# Patient Record
Sex: FEMALE | Race: WHITE | NOT HISPANIC OR LATINO | Employment: FULL TIME | URBAN - METROPOLITAN AREA
[De-identification: names, ages, dates, MRNs, and addresses within clinical notes are randomized per-mention and may not be internally consistent; named-entity substitution may affect disease eponyms.]

---

## 2023-07-10 ENCOUNTER — OFFICE VISIT (OUTPATIENT)
Dept: PRIMARY CARE | Facility: CLINIC | Age: 33
End: 2023-07-10
Payer: COMMERCIAL

## 2023-07-10 VITALS
HEART RATE: 79 BPM | SYSTOLIC BLOOD PRESSURE: 112 MMHG | BODY MASS INDEX: 27.94 KG/M2 | OXYGEN SATURATION: 97 % | TEMPERATURE: 96.9 F | WEIGHT: 148 LBS | HEIGHT: 61 IN | DIASTOLIC BLOOD PRESSURE: 78 MMHG | RESPIRATION RATE: 16 BRPM

## 2023-07-10 DIAGNOSIS — F33.9 RECURRENT MAJOR DEPRESSIVE DISORDER, REMISSION STATUS UNSPECIFIED (CMS-HCC): Primary | ICD-10-CM

## 2023-07-10 DIAGNOSIS — E55.9 VITAMIN D DEFICIENCY: ICD-10-CM

## 2023-07-10 DIAGNOSIS — Z00.00 ENCOUNTER FOR ANNUAL GENERAL MEDICAL EXAMINATION WITHOUT ABNORMAL FINDINGS IN ADULT: ICD-10-CM

## 2023-07-10 DIAGNOSIS — E53.8 VITAMIN B 12 DEFICIENCY: ICD-10-CM

## 2023-07-10 PROCEDURE — 1036F TOBACCO NON-USER: CPT | Performed by: FAMILY MEDICINE

## 2023-07-10 PROCEDURE — 99214 OFFICE O/P EST MOD 30 MIN: CPT | Performed by: FAMILY MEDICINE

## 2023-07-10 PROCEDURE — 99395 PREV VISIT EST AGE 18-39: CPT | Performed by: FAMILY MEDICINE

## 2023-07-10 RX ORDER — IBUPROFEN 400 MG/1
TABLET ORAL EVERY 6 HOURS
COMMUNITY
Start: 2019-03-27

## 2023-07-10 RX ORDER — OMEPRAZOLE 20 MG/1
TABLET, ORALLY DISINTEGRATING, DELAYED RELEASE ORAL
COMMUNITY
Start: 2022-03-31

## 2023-07-10 RX ORDER — SERTRALINE HYDROCHLORIDE 50 MG/1
50 TABLET, FILM COATED ORAL DAILY
COMMUNITY
End: 2023-07-10 | Stop reason: SDUPTHER

## 2023-07-10 RX ORDER — BUPROPION HYDROCHLORIDE 150 MG/1
150 TABLET ORAL
Qty: 90 TABLET | Refills: 1 | Status: SHIPPED | OUTPATIENT
Start: 2023-07-10 | End: 2024-01-11 | Stop reason: SDUPTHER

## 2023-07-10 RX ORDER — MINERAL OIL
ENEMA (ML) RECTAL
COMMUNITY
Start: 2022-07-12

## 2023-07-10 RX ORDER — BUPROPION HYDROCHLORIDE 150 MG/1
150 TABLET ORAL
COMMUNITY
Start: 2023-06-23 | End: 2023-07-10 | Stop reason: SDUPTHER

## 2023-07-10 RX ORDER — TRIAMCINOLONE ACETONIDE 1 MG/G
OINTMENT TOPICAL 2 TIMES DAILY
COMMUNITY
Start: 2022-07-12

## 2023-07-10 RX ORDER — ACETAMINOPHEN, DEXTROMETHORPHAN HBR, DOXYLAMINE SUCCINATE, PHENYLEPHRINE HCL 650; 20; 12.5; 1 MG/30ML; MG/30ML; MG/30ML; MG/30ML
1 SOLUTION ORAL DAILY
COMMUNITY
Start: 2022-07-12 | End: 2024-03-20 | Stop reason: WASHOUT

## 2023-07-10 RX ORDER — ACETAMINOPHEN 500 MG
1 TABLET ORAL DAILY
COMMUNITY
Start: 2022-05-04

## 2023-07-10 RX ORDER — SERTRALINE HYDROCHLORIDE 100 MG/1
100 TABLET, FILM COATED ORAL DAILY
Qty: 90 TABLET | Refills: 1 | Status: SHIPPED | OUTPATIENT
Start: 2023-07-10 | End: 2024-01-11 | Stop reason: SDUPTHER

## 2023-07-10 ASSESSMENT — ENCOUNTER SYMPTOMS
FACIAL SWELLING: 0
TROUBLE SWALLOWING: 0
ADENOPATHY: 0
PALPITATIONS: 0
BRUISES/BLEEDS EASILY: 0
FLANK PAIN: 0
BACK PAIN: 0
NERVOUS/ANXIOUS: 0
LOSS OF SENSATION IN FEET: 0
DIZZINESS: 0
OCCASIONAL FEELINGS OF UNSTEADINESS: 0
ABDOMINAL PAIN: 0
POLYDIPSIA: 0
DEPRESSION: 0
EYE PAIN: 0
FATIGUE: 0
EYE REDNESS: 0
SLEEP DISTURBANCE: 0
CHILLS: 0
DYSURIA: 0
NECK STIFFNESS: 0
COUGH: 0
APPETITE CHANGE: 0
CONSTIPATION: 0
WOUND: 0
WHEEZING: 0
COLOR CHANGE: 0
SORE THROAT: 0
VOICE CHANGE: 0
JOINT SWELLING: 0
NAUSEA: 0
CHEST TIGHTNESS: 0
MYALGIAS: 0
SHORTNESS OF BREATH: 0
AGITATION: 0
EYE ITCHING: 0
HEMATURIA: 0
ARTHRALGIAS: 0
RHINORRHEA: 0
UNEXPECTED WEIGHT CHANGE: 0
ACTIVITY CHANGE: 0
POLYPHAGIA: 0
BLOOD IN STOOL: 0
SINUS PRESSURE: 0
FREQUENCY: 0
EYE DISCHARGE: 0
VOMITING: 0
SINUS PAIN: 0
FEVER: 0
DIARRHEA: 0
DIAPHORESIS: 0

## 2023-07-10 ASSESSMENT — ANXIETY QUESTIONNAIRES
1. FEELING NERVOUS, ANXIOUS, OR ON EDGE: SEVERAL DAYS
6. BECOMING EASILY ANNOYED OR IRRITABLE: SEVERAL DAYS
GAD7 TOTAL SCORE: 5
5. BEING SO RESTLESS THAT IT IS HARD TO SIT STILL: NOT AT ALL
7. FEELING AFRAID AS IF SOMETHING AWFUL MIGHT HAPPEN: NOT AT ALL
4. TROUBLE RELAXING: SEVERAL DAYS
2. NOT BEING ABLE TO STOP OR CONTROL WORRYING: SEVERAL DAYS
3. WORRYING TOO MUCH ABOUT DIFFERENT THINGS: SEVERAL DAYS

## 2023-07-10 ASSESSMENT — PROMIS GLOBAL HEALTH SCALE
RATE_GENERAL_HEALTH: GOOD
CARRYOUT_SOCIAL_ACTIVITIES: GOOD
RATE_SOCIAL_SATISFACTION: FAIR
RATE_QUALITY_OF_LIFE: GOOD
RATE_PHYSICAL_HEALTH: GOOD
RATE_AVERAGE_FATIGUE: MODERATE
CARRYOUT_PHYSICAL_ACTIVITIES: COMPLETELY
EMOTIONAL_PROBLEMS: OFTEN
RATE_MENTAL_HEALTH: FAIR
RATE_AVERAGE_PAIN: 3

## 2023-07-10 ASSESSMENT — PATIENT HEALTH QUESTIONNAIRE - PHQ9
1. LITTLE INTEREST OR PLEASURE IN DOING THINGS: NOT AT ALL
2. FEELING DOWN, DEPRESSED OR HOPELESS: NOT AT ALL
2. FEELING DOWN, DEPRESSED OR HOPELESS: SEVERAL DAYS
SUM OF ALL RESPONSES TO PHQ9 QUESTIONS 1 AND 2: 0
1. LITTLE INTEREST OR PLEASURE IN DOING THINGS: SEVERAL DAYS
SUM OF ALL RESPONSES TO PHQ9 QUESTIONS 1 AND 2: 2

## 2023-07-10 NOTE — PROGRESS NOTES
Subjective   Patient ID: Ashley E Broadbent is a 32 y.o. female who presents for Annual Exam.  Today she is accompanied by alone.     Well Adult Physical   Patient here for a comprehensive physical exam.The patient reports problems -    Depression:  PHQ2 Score: 2  Feels like the medication is working but still thinks there is some room for improvement of mood.     Do you take any herbs or supplements that were not prescribed by a doctor? no   Are you taking calcium supplements? no   Are you taking aspirin daily? no     History:  LMP: 6/30/23  Last pap date: 2020  Abnormal pap? no        Review of Systems   Constitutional:  Negative for activity change, appetite change, chills, diaphoresis, fatigue, fever and unexpected weight change.   HENT:  Negative for congestion, dental problem, drooling, ear discharge, ear pain, facial swelling, hearing loss, nosebleeds, postnasal drip, rhinorrhea, sinus pressure, sinus pain, sneezing, sore throat, tinnitus, trouble swallowing and voice change.    Eyes:  Negative for pain, discharge, redness, itching and visual disturbance.   Respiratory:  Negative for cough, chest tightness, shortness of breath and wheezing.    Cardiovascular:  Negative for chest pain, palpitations and leg swelling.   Gastrointestinal:  Negative for abdominal pain, blood in stool, constipation, diarrhea, nausea and vomiting.   Endocrine: Negative for cold intolerance, heat intolerance, polydipsia, polyphagia and polyuria.   Genitourinary:  Negative for decreased urine volume, dysuria, flank pain, frequency, hematuria and urgency.   Musculoskeletal:  Negative for arthralgias, back pain, gait problem, joint swelling, myalgias and neck stiffness.   Skin:  Negative for color change, pallor, rash and wound.   Neurological:  Negative for dizziness.   Hematological:  Negative for adenopathy. Does not bruise/bleed easily.   Psychiatric/Behavioral:  Negative for agitation, behavioral problems and sleep disturbance.  "The patient is not nervous/anxious.        Objective   Blood Pressure 112/78   Pulse 79   Temperature 36.1 °C (96.9 °F)   Respiration 16   Height 1.549 m (5' 1\")   Weight 67.1 kg (148 lb)   Last Menstrual Period  (LMP Unknown) Comment: Dr. Singletary  Oxygen Saturation 97%   Body Mass Index 27.96 kg/m²   BSA: 1.7 meters squared  Growth percentiles: Facility age limit for growth %danuta is 20 years. Facility age limit for growth %danuta is 20 years.     Physical Exam  Vitals and nursing note reviewed.   Constitutional:       General: She is not in acute distress.     Appearance: Normal appearance. She is normal weight. She is not ill-appearing or toxic-appearing.   HENT:      Head: Normocephalic.      Right Ear: Tympanic membrane, ear canal and external ear normal. There is no impacted cerumen.      Left Ear: Tympanic membrane, ear canal and external ear normal. There is no impacted cerumen.      Nose: Nose normal. No congestion or rhinorrhea.      Mouth/Throat:      Mouth: Mucous membranes are moist.      Pharynx: Oropharynx is clear. No oropharyngeal exudate or posterior oropharyngeal erythema.   Eyes:      General: No scleral icterus.        Right eye: No discharge.         Left eye: No discharge.      Extraocular Movements: Extraocular movements intact.      Conjunctiva/sclera: Conjunctivae normal.      Pupils: Pupils are equal, round, and reactive to light.   Neck:      Vascular: No carotid bruit.   Cardiovascular:      Rate and Rhythm: Normal rate and regular rhythm.      Pulses: Normal pulses.      Heart sounds: Normal heart sounds. No murmur heard.     No gallop.   Pulmonary:      Effort: No respiratory distress.      Breath sounds: No wheezing or rhonchi.   Chest:      Chest wall: No tenderness.   Abdominal:      General: Abdomen is flat. Bowel sounds are normal.      Palpations: Abdomen is soft. There is no mass.      Tenderness: There is no abdominal tenderness. There is no guarding or rebound.      " Hernia: No hernia is present.   Musculoskeletal:         General: No swelling or tenderness. Normal range of motion.      Cervical back: Normal range of motion. No rigidity or tenderness.      Right lower leg: No edema.      Left lower leg: No edema.   Lymphadenopathy:      Cervical: No cervical adenopathy.   Skin:     General: Skin is warm and dry.      Coloration: Skin is not pale.      Findings: No bruising, erythema or rash.   Neurological:      General: No focal deficit present.      Mental Status: She is alert and oriented to person, place, and time.      Sensory: No sensory deficit.      Coordination: Coordination normal.      Gait: Gait normal.      Deep Tendon Reflexes: Reflexes normal.   Psychiatric:         Mood and Affect: Mood normal.         Behavior: Behavior normal.         Thought Content: Thought content normal.         Judgment: Judgment normal.         Assessment/Plan   Problem List Items Addressed This Visit    None  Visit Diagnoses       Recurrent major depressive disorder, remission status unspecified (CMS/[facility])    -  Primary    Relevant Medications    sertraline (Zoloft) 100 mg tablet    buPROPion XL (Wellbutrin XL) 150 mg 24 hr tablet    Other Relevant Orders    TSH with reflex to Free T4 if abnormal    Encounter for annual general medical examination without abnormal findings in adult        Relevant Orders    Lipid Panel    CBC and Auto Differential    Comprehensive Metabolic Panel    HIV-1 and HIV-2 antibodies    Hepatitis C antibody    Vitamin D deficiency        Relevant Orders    Vitamin D, Total    Comprehensive Metabolic Panel    Vitamin B 12 deficiency        Relevant Orders    Vitamin B12    Comprehensive Metabolic Panel          Current Outpatient Medications   Medication Sig Dispense Refill    cholecalciferol (Vitamin D-3) 50 mcg (2,000 unit) capsule Take 1 capsule (50 mcg) by mouth once daily.      cyanocobalamin, vitamin B-12, (Vitamin B-12) 1,000 mcg tablet extended release  Take 1,000 mcg by mouth once daily.      fexofenadine (Allegra Allergy) 180 mg tablet Take by mouth once daily.      ibuprofen 400 mg tablet Take by mouth every 6 hours.      omeprazole 20 mg tablet,disintegrat, delay rel Take by mouth.      triamcinolone (Kenalog) 0.1 % ointment twice a day.      buPROPion XL (Wellbutrin XL) 150 mg 24 hr tablet Take 1 tablet (150 mg) by mouth once daily in the morning. Take before meals. 90 tablet 1    sertraline (Zoloft) 100 mg tablet Take 1 tablet (100 mg) by mouth once daily. 90 tablet 1     No current facility-administered medications for this visit.     Follow up in 4-6wks for Depression  If you experience any concerning side effects, please call.  Increased Sertraline to 100mg Daily  Medication refills sent to [facility]

## 2023-08-14 ENCOUNTER — LAB (OUTPATIENT)
Dept: LAB | Facility: LAB | Age: 33
End: 2023-08-14
Payer: COMMERCIAL

## 2023-08-14 DIAGNOSIS — E53.8 VITAMIN B 12 DEFICIENCY: ICD-10-CM

## 2023-08-14 DIAGNOSIS — Z00.00 ENCOUNTER FOR ANNUAL GENERAL MEDICAL EXAMINATION WITHOUT ABNORMAL FINDINGS IN ADULT: ICD-10-CM

## 2023-08-14 DIAGNOSIS — F33.9 RECURRENT MAJOR DEPRESSIVE DISORDER, REMISSION STATUS UNSPECIFIED (CMS-HCC): ICD-10-CM

## 2023-08-14 DIAGNOSIS — E55.9 VITAMIN D DEFICIENCY: ICD-10-CM

## 2023-08-14 LAB
ALANINE AMINOTRANSFERASE (SGPT) (U/L) IN SER/PLAS: 9 U/L (ref 7–45)
ALBUMIN (G/DL) IN SER/PLAS: 4.6 G/DL (ref 3.4–5)
ALKALINE PHOSPHATASE (U/L) IN SER/PLAS: 47 U/L (ref 33–110)
ANION GAP IN SER/PLAS: 12 MMOL/L (ref 10–20)
ASPARTATE AMINOTRANSFERASE (SGOT) (U/L) IN SER/PLAS: 15 U/L (ref 9–39)
BASOPHILS (10*3/UL) IN BLOOD BY AUTOMATED COUNT: 0.05 X10E9/L (ref 0–0.1)
BASOPHILS/100 LEUKOCYTES IN BLOOD BY AUTOMATED COUNT: 0.6 % (ref 0–2)
BILIRUBIN TOTAL (MG/DL) IN SER/PLAS: 0.3 MG/DL (ref 0–1.2)
CALCIUM (MG/DL) IN SER/PLAS: 9.7 MG/DL (ref 8.6–10.6)
CARBON DIOXIDE, TOTAL (MMOL/L) IN SER/PLAS: 28 MMOL/L (ref 21–32)
CHLORIDE (MMOL/L) IN SER/PLAS: 104 MMOL/L (ref 98–107)
CHOLESTEROL (MG/DL) IN SER/PLAS: 243 MG/DL (ref 0–199)
CHOLESTEROL IN HDL (MG/DL) IN SER/PLAS: 55.9 MG/DL
CHOLESTEROL/HDL RATIO: 4.3
CREATININE (MG/DL) IN SER/PLAS: 0.68 MG/DL (ref 0.5–1.05)
EOSINOPHILS (10*3/UL) IN BLOOD BY AUTOMATED COUNT: 0.16 X10E9/L (ref 0–0.7)
EOSINOPHILS/100 LEUKOCYTES IN BLOOD BY AUTOMATED COUNT: 1.8 % (ref 0–6)
ERYTHROCYTE DISTRIBUTION WIDTH (RATIO) BY AUTOMATED COUNT: 13.2 % (ref 11.5–14.5)
ERYTHROCYTE MEAN CORPUSCULAR HEMOGLOBIN CONCENTRATION (G/DL) BY AUTOMATED: 31.3 G/DL (ref 32–36)
ERYTHROCYTE MEAN CORPUSCULAR VOLUME (FL) BY AUTOMATED COUNT: 91 FL (ref 80–100)
ERYTHROCYTES (10*6/UL) IN BLOOD BY AUTOMATED COUNT: 4.83 X10E12/L (ref 4–5.2)
GFR FEMALE: >90 ML/MIN/1.73M2
GLUCOSE (MG/DL) IN SER/PLAS: 79 MG/DL (ref 74–99)
HEMATOCRIT (%) IN BLOOD BY AUTOMATED COUNT: 43.8 % (ref 36–46)
HEMOGLOBIN (G/DL) IN BLOOD: 13.7 G/DL (ref 12–16)
IMMATURE GRANULOCYTES/100 LEUKOCYTES IN BLOOD BY AUTOMATED COUNT: 0.3 % (ref 0–0.9)
LDL: 151 MG/DL (ref 0–99)
LEUKOCYTES (10*3/UL) IN BLOOD BY AUTOMATED COUNT: 8.9 X10E9/L (ref 4.4–11.3)
LYMPHOCYTES (10*3/UL) IN BLOOD BY AUTOMATED COUNT: 2.55 X10E9/L (ref 1.2–4.8)
LYMPHOCYTES/100 LEUKOCYTES IN BLOOD BY AUTOMATED COUNT: 28.7 % (ref 13–44)
MONOCYTES (10*3/UL) IN BLOOD BY AUTOMATED COUNT: 0.62 X10E9/L (ref 0.1–1)
MONOCYTES/100 LEUKOCYTES IN BLOOD BY AUTOMATED COUNT: 7 % (ref 2–10)
NEUTROPHILS (10*3/UL) IN BLOOD BY AUTOMATED COUNT: 5.48 X10E9/L (ref 1.2–7.7)
NEUTROPHILS/100 LEUKOCYTES IN BLOOD BY AUTOMATED COUNT: 61.6 % (ref 40–80)
NRBC (PER 100 WBCS) BY AUTOMATED COUNT: 0 /100 WBC (ref 0–0)
PLATELETS (10*3/UL) IN BLOOD AUTOMATED COUNT: 324 X10E9/L (ref 150–450)
POTASSIUM (MMOL/L) IN SER/PLAS: 4.2 MMOL/L (ref 3.5–5.3)
PROTEIN TOTAL: 7.1 G/DL (ref 6.4–8.2)
SODIUM (MMOL/L) IN SER/PLAS: 140 MMOL/L (ref 136–145)
TRIGLYCERIDE (MG/DL) IN SER/PLAS: 180 MG/DL (ref 0–149)
UREA NITROGEN (MG/DL) IN SER/PLAS: 12 MG/DL (ref 6–23)
VLDL: 36 MG/DL (ref 0–40)

## 2023-08-14 PROCEDURE — 80053 COMPREHEN METABOLIC PANEL: CPT

## 2023-08-14 PROCEDURE — 87389 HIV-1 AG W/HIV-1&-2 AB AG IA: CPT

## 2023-08-14 PROCEDURE — 82607 VITAMIN B-12: CPT

## 2023-08-14 PROCEDURE — 85025 COMPLETE CBC W/AUTO DIFF WBC: CPT

## 2023-08-14 PROCEDURE — 80061 LIPID PANEL: CPT

## 2023-08-14 PROCEDURE — 36415 COLL VENOUS BLD VENIPUNCTURE: CPT

## 2023-08-14 PROCEDURE — 82306 VITAMIN D 25 HYDROXY: CPT

## 2023-08-14 PROCEDURE — 84443 ASSAY THYROID STIM HORMONE: CPT

## 2023-08-14 PROCEDURE — 86803 HEPATITIS C AB TEST: CPT

## 2023-08-15 ENCOUNTER — APPOINTMENT (OUTPATIENT)
Dept: PRIMARY CARE | Facility: CLINIC | Age: 33
End: 2023-08-15
Payer: COMMERCIAL

## 2023-08-15 LAB
CALCIDIOL (25 OH VITAMIN D3) (NG/ML) IN SER/PLAS: 48 NG/ML
COBALAMIN (VITAMIN B12) (PG/ML) IN SER/PLAS: 266 PG/ML (ref 211–911)
HEPATITIS C VIRUS AB PRESENCE IN SERUM: NONREACTIVE
HIV 1/ 2 AG/AB SCREEN: NONREACTIVE
THYROTROPIN (MIU/L) IN SER/PLAS BY DETECTION LIMIT <= 0.05 MIU/L: 2.76 MIU/L (ref 0.44–3.98)

## 2023-08-17 ENCOUNTER — OFFICE VISIT (OUTPATIENT)
Dept: PRIMARY CARE | Facility: CLINIC | Age: 33
End: 2023-08-17
Payer: COMMERCIAL

## 2023-08-17 VITALS
DIASTOLIC BLOOD PRESSURE: 75 MMHG | TEMPERATURE: 97.4 F | BODY MASS INDEX: 27.78 KG/M2 | OXYGEN SATURATION: 97 % | HEART RATE: 80 BPM | SYSTOLIC BLOOD PRESSURE: 112 MMHG | WEIGHT: 147 LBS | RESPIRATION RATE: 16 BRPM

## 2023-08-17 DIAGNOSIS — E53.8 VITAMIN B 12 DEFICIENCY: ICD-10-CM

## 2023-08-17 DIAGNOSIS — F41.1 GENERALIZED ANXIETY DISORDER: ICD-10-CM

## 2023-08-17 DIAGNOSIS — E78.2 MIXED HYPERLIPIDEMIA: Primary | ICD-10-CM

## 2023-08-17 DIAGNOSIS — R73.09 ABNORMAL GLUCOSE: ICD-10-CM

## 2023-08-17 DIAGNOSIS — F33.9 CHRONIC MAJOR DEPRESSIVE DISORDER, RECURRENT EPISODE (CMS-HCC): ICD-10-CM

## 2023-08-17 DIAGNOSIS — E55.9 VITAMIN D DEFICIENCY: ICD-10-CM

## 2023-08-17 PROBLEM — L20.9 ATOPIC ECZEMA: Status: RESOLVED | Noted: 2023-08-17 | Resolved: 2023-08-17

## 2023-08-17 PROBLEM — J45.909 REACTIVE AIRWAY DISEASE (HHS-HCC): Status: RESOLVED | Noted: 2023-08-17 | Resolved: 2023-08-17

## 2023-08-17 PROBLEM — R00.2 PALPITATION: Status: RESOLVED | Noted: 2023-08-17 | Resolved: 2023-08-17

## 2023-08-17 PROBLEM — R61 NIGHT SWEATS: Status: RESOLVED | Noted: 2023-08-17 | Resolved: 2023-08-17

## 2023-08-17 PROBLEM — J30.9 ALLERGIC RHINITIS: Status: RESOLVED | Noted: 2023-08-17 | Resolved: 2023-08-17

## 2023-08-17 PROBLEM — N92.6 IRREGULAR PERIODS: Status: RESOLVED | Noted: 2022-03-23 | Resolved: 2023-08-17

## 2023-08-17 PROCEDURE — 99214 OFFICE O/P EST MOD 30 MIN: CPT | Performed by: FAMILY MEDICINE

## 2023-08-17 PROCEDURE — 96372 THER/PROPH/DIAG INJ SC/IM: CPT | Performed by: FAMILY MEDICINE

## 2023-08-17 PROCEDURE — 1036F TOBACCO NON-USER: CPT | Performed by: FAMILY MEDICINE

## 2023-08-17 RX ORDER — CYANOCOBALAMIN 1000 UG/ML
1000 INJECTION, SOLUTION INTRAMUSCULAR; SUBCUTANEOUS ONCE
Status: COMPLETED | OUTPATIENT
Start: 2023-08-17 | End: 2023-08-17

## 2023-08-17 RX ADMIN — CYANOCOBALAMIN 1000 MCG: 1000 INJECTION, SOLUTION INTRAMUSCULAR; SUBCUTANEOUS at 15:02

## 2023-08-17 ASSESSMENT — ENCOUNTER SYMPTOMS
FATIGUE: 0
NERVOUS/ANXIOUS: 0
PANIC: 0
EYE DISCHARGE: 0
HOPELESSNESS: 1
UNEXPECTED WEIGHT CHANGE: 0
ACTIVITY CHANGE: 0
NAUSEA: 0
THOUGHTS THAT DEATH WOULD BE EASIER: 0
NECK STIFFNESS: 0
FREQUENCY: 0
BACK PAIN: 0
SINUS PAIN: 0
CONSTIPATION: 0
PSYCHOMOTOR AGITATION: 0
SORE THROAT: 0
ARTHRALGIAS: 0
MUSCLE TENSION: 0
DYSURIA: 0
EYE REDNESS: 0
AGITATION: 0
FACIAL SWELLING: 0
POLYDIPSIA: 0
SLEEP DISTURBANCE: 0
WHEEZING: 0
CONFUSION: 0
LOSS OF SENSATION IN FEET: 0
EYE PAIN: 0
DIARRHEA: 0
APPETITE CHANGE: 0
CHEST TIGHTNESS: 0
ADENOPATHY: 0
VOICE CHANGE: 0
PALPITATIONS: 0
DEPRESSION: 1
BLOOD IN STOOL: 0
WOUND: 0
VOMITING: 0
MEMORY IMPAIRMENT: 0
DECREASED CONCENTRATION: 0
DEPRESSED MOOD: 0
ANHEDONIA: 0
HYPERSOMNIA: 0
JOINT SWELLING: 0
SLEEP QUALITY: GOOD
RESTLESSNESS: 0
COUGH: 0
EYE ITCHING: 0
FEVER: 0
HYPERVENTILATION: 0
CHILLS: 0
COMPULSIONS: 0
IRRITABILITY: 0
CHOKING: 0
PSYCHOMOTOR RETARDATION: 0
WEIGHT LOSS: 0
DIAPHORESIS: 0
OCCASIONAL FEELINGS OF UNSTEADINESS: 0
FEELINGS OF WORTHLESSNESS: 0
THOUGHT CONTENT - OBSESSIONS: 0
DEPRESSION: 0
TROUBLE SWALLOWING: 0
HEMATURIA: 0
MYALGIAS: 0
BRUISES/BLEEDS EASILY: 0
POLYPHAGIA: 0
INSOMNIA: 0
RHINORRHEA: 0
ABDOMINAL PAIN: 0
SINUS PRESSURE: 0
MALAISE: 0
DIZZINESS: 0
FLANK PAIN: 0
WEIGHT GAIN: 0
SHORTNESS OF BREATH: 0
COLOR CHANGE: 0

## 2023-08-17 ASSESSMENT — PATIENT HEALTH QUESTIONNAIRE - PHQ9
1. LITTLE INTEREST OR PLEASURE IN DOING THINGS: NOT AT ALL
2. FEELING DOWN, DEPRESSED OR HOPELESS: NOT AT ALL
SUM OF ALL RESPONSES TO PHQ9 QUESTIONS 1 AND 2: 0

## 2023-08-17 NOTE — PROGRESS NOTES
Subjective   Patient ID: Ashley E Broadbent is a 33 y.o. female who presents for Depression (Follow up and review labs).  Today she is accompanied by accompanied by child.     Depression  Visit Type: follow-up  Patient presents with the following symptoms: feelings of hopelessness.  Patient is not experiencing: anhedonia, chest pain, choking sensation, compulsions, confusion, decreased concentration, depressed mood, dizziness, dry mouth, excessive worry, fatigue, feelings of worthlessness, hypersomnia, hyperventilation, impotence, insomnia, irritability, malaise, memory impairment, muscle tension, nausea, nervousness/anxiety, obsessions, palpitations, panic, psychomotor agitation, psychomotor retardation, restlessness, shortness of breath, suicidal ideas, suicidal planning, thoughts of death, weight gain and weight loss.  Frequency of symptoms: occasionally   Severity: mild   Sleep quality: good  Nighttime awakenings: none  Compliance with medications:  %      Hyperlipidemia  Ashley E Broadbent is a 33 y.o. female who presents for follow-up of dyslipidemia. A repeat fasting lipid profile was done. The patient does not use medications that may worsen dyslipidemias (corticosteroids, progestins, anabolic steroids, diuretics, beta-blockers, amiodarone, cyclosporine, olanzapine). Exercise: rarely. Previous history of cardiac disease includes: none.    Cardiac Risk Factors  Age > 45-male, > 55-female:  NO   Smoking:   NO   Sig. family hx of CHD*:  YES  +1   Hypertension:   NO   Diabetes:   NO   HDL < 35:   NO   HDL > 59:   YES  -1   Total:   0     *Significant family history of CHD per NCEP = MI or sudden death at less than 55 year old in father or other 1st-degree male relative, or less than 65 year old in mother or other 1st-degree female relative.    The following portions of the chart were reviewed this encounter and updated as appropriate:  Tobacco  Allergies  Meds  Problems  Med Hx  Surg Hx  Fam Hx        Review of Systems   Constitutional:  Negative for activity change, appetite change, chills, diaphoresis, fatigue, fever, irritability, unexpected weight change, weight gain and weight loss.   HENT:  Negative for congestion, dental problem, drooling, ear discharge, ear pain, facial swelling, hearing loss, nosebleeds, postnasal drip, rhinorrhea, sinus pressure, sinus pain, sneezing, sore throat, tinnitus, trouble swallowing and voice change.    Eyes:  Negative for pain, discharge, redness, itching and visual disturbance.   Respiratory:  Negative for cough, choking, chest tightness, shortness of breath and wheezing.    Cardiovascular:  Negative for chest pain, palpitations and leg swelling.   Gastrointestinal:  Negative for abdominal pain, blood in stool, constipation, diarrhea, nausea and vomiting.   Endocrine: Negative for cold intolerance, heat intolerance, polydipsia, polyphagia and polyuria.   Genitourinary:  Negative for decreased urine volume, dysuria, flank pain, frequency, hematuria, impotence and urgency.   Musculoskeletal:  Negative for arthralgias, back pain, gait problem, joint swelling, myalgias and neck stiffness.   Skin:  Negative for color change, pallor, rash and wound.   Neurological:  Negative for dizziness.   Hematological:  Negative for adenopathy. Does not bruise/bleed easily.   Psychiatric/Behavioral:  Positive for depression. Negative for agitation, behavioral problems, confusion, decreased concentration, sleep disturbance and suicidal ideas. The patient is not nervous/anxious and does not have insomnia.        Objective   Blood Pressure 112/75 (BP Location: Left arm, Patient Position: Sitting, BP Cuff Size: Adult)   Pulse 80   Temperature 36.3 °C (97.4 °F)   Respiration 16   Weight 66.7 kg (147 lb)   Oxygen Saturation 97%   Body Mass Index 27.78 kg/m²   BSA: 1.69 meters squared  Growth percentiles: Facility age limit for growth %danuta is 20 years. Facility age limit for growth %danuta is  20 years.     Physical Exam  Vitals and nursing note reviewed.   Constitutional:       General: She is not in acute distress.     Appearance: Normal appearance. She is normal weight. She is not ill-appearing or toxic-appearing.   HENT:      Head: Normocephalic.      Right Ear: Tympanic membrane, ear canal and external ear normal. There is no impacted cerumen.      Left Ear: Tympanic membrane, ear canal and external ear normal. There is no impacted cerumen.      Nose: Nose normal. No congestion or rhinorrhea.      Mouth/Throat:      Mouth: Mucous membranes are moist.      Pharynx: Oropharynx is clear. No oropharyngeal exudate or posterior oropharyngeal erythema.   Eyes:      General: No scleral icterus.        Right eye: No discharge.         Left eye: No discharge.      Extraocular Movements: Extraocular movements intact.      Conjunctiva/sclera: Conjunctivae normal.      Pupils: Pupils are equal, round, and reactive to light.   Neck:      Vascular: No carotid bruit.   Cardiovascular:      Rate and Rhythm: Normal rate and regular rhythm.      Pulses: Normal pulses.      Heart sounds: Normal heart sounds. No murmur heard.     No gallop.   Pulmonary:      Effort: No respiratory distress.      Breath sounds: No wheezing or rhonchi.   Chest:      Chest wall: No tenderness.   Abdominal:      General: Abdomen is flat. Bowel sounds are normal.      Palpations: Abdomen is soft. There is no mass.      Tenderness: There is no abdominal tenderness. There is no guarding or rebound.      Hernia: No hernia is present.   Musculoskeletal:         General: No swelling or tenderness. Normal range of motion.      Cervical back: Normal range of motion. No rigidity or tenderness.      Right lower leg: No edema.      Left lower leg: No edema.   Lymphadenopathy:      Cervical: No cervical adenopathy.   Skin:     General: Skin is warm and dry.      Coloration: Skin is not pale.      Findings: No bruising, erythema or rash.   Neurological:       General: No focal deficit present.      Mental Status: She is alert and oriented to person, place, and time.      Sensory: No sensory deficit.      Coordination: Coordination normal.      Gait: Gait normal.      Deep Tendon Reflexes: Reflexes normal.   Psychiatric:         Mood and Affect: Mood normal.         Behavior: Behavior normal.         Thought Content: Thought content normal.         Judgment: Judgment normal.         Assessment/Plan   Problem List Items Addressed This Visit       Generalized anxiety disorder    Relevant Orders    Magnesium    Chronic major depressive disorder, recurrent episode (CMS/HCC)    Relevant Orders    Magnesium    Vitamin D deficiency    Relevant Orders    Vitamin D, Total     Other Visit Diagnoses       Mixed hyperlipidemia    -  Primary    Relevant Orders    Lipid Panel    CBC and Auto Differential    Comprehensive Metabolic Panel    CT cardiac scoring wo IV contrast    Vitamin B 12 deficiency        Relevant Medications    cyanocobalamin (Vitamin B-12) injection 1,000 mcg    Other Relevant Orders    Vitamin B12    Abnormal glucose        Relevant Orders    Hemoglobin A1C          Current Outpatient Medications   Medication Sig Dispense Refill    buPROPion XL (Wellbutrin XL) 150 mg 24 hr tablet Take 1 tablet (150 mg) by mouth once daily in the morning. Take before meals. 90 tablet 1    cholecalciferol (Vitamin D-3) 50 mcg (2,000 unit) capsule Take 1 capsule (50 mcg) by mouth once daily.      cyanocobalamin, vitamin B-12, (Vitamin B-12) 1,000 mcg tablet extended release Take 1,000 mcg by mouth once daily.      fexofenadine (Allegra Allergy) 180 mg tablet Take by mouth once daily.      ibuprofen 400 mg tablet Take by mouth every 6 hours.      omeprazole 20 mg tablet,disintegrat, delay rel Take by mouth.      sertraline (Zoloft) 100 mg tablet Take 1 tablet (100 mg) by mouth once daily. 90 tablet 1    triamcinolone (Kenalog) 0.1 % ointment twice a day.       Current  Facility-Administered Medications   Medication Dose Route Frequency Provider Last Rate Last Admin    cyanocobalamin (Vitamin B-12) injection 1,000 mcg  1,000 mcg subcutaneous Once Charlette Valladares MD         Depression      What is depression?;  When doctors talk about depression, they mean the medical illness called major depression. Someone who has major depression has symptoms nearly every day, all day, for 2 weeks or longer. There is also a minor form of depression that causes less severe symptoms. Both kinds of depression have the same causes and treatment.;  Depression can affect people of all ages and is different for every person. A person who has depression can't control his or her feelings. If you or your child, teen, or older relative is depressed, it's not his or her fault.     Women are twice as likely as men to experience depression. The reason for this is unknown, but changes in a woman's hormone levels may be related to depression.     What are the symptoms of depression?;  The symptoms of depression are different for every person. You may have one or many of the symptoms listed below. Your symptoms may include only emotional or only physical symptoms, or both.;     Emotional symptoms  Crying easily or for no reason ;  Feeling guilty or worthless ;  Feeling restless, irritated, and easily annoyed ;  Feeling sad, numb, or hopeless ;  Losing interest or pleasure in things you used to enjoy (including sex) ;  Thinking about death or suicide;     Physical symptoms  Changes in appetite (eating more than usual, or eating less than usual) ;  Feeling very tired all the time ;  Having other aches and pains that don't get better with treatment ;  Having trouble paying attention, recalling things, concentrating, and making decisions ;  Headaches, backaches, or digestive problems ;  Sleeping too much, or having problems sleeping ;  Unintended weight loss or gain;  The symptoms of depression may be different for  "children, teens, and seniors.;     What causes depression?  Depression may be caused by an imbalance of chemicals in the brain. Sometimes there aren't enough chemical messengers (called neurotransmitters) in the brain. Examples of neurotransmitters that affect your mood are serotonin (say: \"sair-a-tone-in\"), norepinephrine (say: \"nor-ep-in-ef-rin\"), and dopamine (say: \"dope-a-mean\"). A chemical imbalance in the brain may be caused by one or more of the following:;  Your genes. Sometimes depression is hereditary, meaning it runs in your family. If you have a parent or sibling who has depression, you may be more at risk for having depression yourself. ;  A medical condition. Problems with your thyroid, nutrient deficiencies, or chronic diseases such as heart disease, diabetes, or cancer may cause depression. ;  Events in your life. Depression can be triggered by stressful events in your life, such as the death of someone you love, a divorce, chronic illness, or loss of a job.   Medicines, drugs, or alcohol. Taking certain medicines, abusing drugs or alcohol, or having other illnesses can also lead to depression.;     Depression is not caused by personal weakness, laziness, or lack of willpower.;     Can giving birth cause depression?;  In the days following the birth of a baby, it is common for some mothers to have mood swings. They may feel a little depressed, have a hard time concentrating, lose their appetite, or find that they can't sleep well even when the baby is asleep. This is called the baby blues and goes away within 10 days after delivery. However, some women have worse symptoms or symptoms that last longer. This is called postpartum depression.     How is depression treated?  Depression can be treated with medicines, with counseling <[email or web address]/familydoctor/[email or web address]-[email or web address]-[email or web address]-health/therapy-and-counseling.html>, or with both. A nutritious diet, exercising on a " regular basis, and avoiding alcohol, drugs, and too much caffeine can also help.;     How long will the depression last?  This depends on how soon you get help. Left untreated, depression can last for weeks, months, or even years. The main risk in not getting treatment is suicide. Treatment can help depression lift in 8 to 12 weeks or less.;     What medicines are used to treat depression?;  Medicines that treat depression are called antidepressants. They help increase the number of chemical messengers (serotonin, norepinephrine, dopamine) in your brain.  Antidepressants work differently for different people. They also have different side effects. So, even if one medicine bothers you or doesn't work for you, another may help. You may notice improvement as soon as 1 week after you start taking the medicine. But you probably won't see the full effects for about 8 to 12 weeks. You may have side effects at first, but they tend to decrease after a couple of weeks. Don't stop taking the medicine without checking with your doctor first.;     How does counseling help?;   A combination of medicine and talk therapy is usually the most effective way of treating more severe depression. If you continue the combination treatment for at least a year, you are less likely to have depression come back.;you talk with a trained therapist or counselor about things that are going on in your life. The focus may be on your thoughts and beliefs, on things that happened in your past, or on your relationships. Or the focus may be on your behavior, how it's affecting you, and what you can do differently.      Tips on getting through depression  DO:  Pace yourself.   Get involved in activities that make you feel good or feel like you've achieved something.   Avoid drugs and alcohol. Both make depression worse. Both can cause dangerous side effects with antidepressant medicines.   Exercise often to make yourself feel better. Physical activity  "seems to cause a chemical reaction in the body that can improve your mood. Exercising 4 to 6 times a week for at least 30 minutes each time is a good goal. But even less activity can be helpful.   Eat balanced meals and healthful foods.   Get enough sleep.   Take your medicine and/or go to counseling as often as your doctor recommends. Your medicine won't work if you only take it once in a while.   Set small goals for yourself, because you may have less energy.   Encourage yourself.   Get as much information as you can about depression and how to treat it.   Call your doctor or the local suicide crisis center right away if you start thinking about suicide.  DON'T:  Don't isolate yourself. Stay in touch with your loved ones and friends, your [employer] advisor, and your family doctor.   Don't believe negative thoughts you may have, such as blaming yourself or expecting to fail. This thinking is part of depression. These thoughts will go away as your depression lifts.   Don't blame yourself for your depression. You didn't cause it.   Don't make major life decisions (for example, about separation or divorce). You may not be thinking clearly while you are depressed, so the decisions you make at this time may not be the best ones for you. If you must make a big decision, ask someone you trust to help you.   Don't expect to do everything you normally can. Set a realistic schedule.   Don't get discouraged. It will take time for your depression to lift fully. Be patient with yourself.   Don't give up.     READ: \"The Mindfulness and Acceptance Workbook for Depression\" by Collins and Alma  \"The Mindful Way Through Depression\" by Pillo Harrison Segal, and [place]ot-Zinn        Suicide;  People who have depression sometimes think about suicide. This thinking is a common part of the depression. If you have thoughts about hurting yourself, tell someone. You could tell your doctor, your friends, your family, or call your local " suicide hotline, such as the National Suicide Prevention Lifeline at 1-948.117.5275.;  ou have high cholesterol. (hyperlipidemia). This increases your risk for cardiovascular disease.(Heart attack/stroke/circulation);  Continue any prescribed and advised OTC medications, in addition, as reminder:;   For high LDL (>130) use blueberries 1 cup daily;   Plant Stanol dose 950mg twice daily (may need to find online source for this dose), and ;    Metamucil/psyllium fiber 7 grams daily.;  ;  For high Triglycerides:;   Fish oil (start at 2000-3000mg daily);  ;   You should exercise 30 minutes daily. ;  ;   Your nutrition plan needs to emphasizes vegetables, fruits, and lean meat. I recommend the Mediterranean Diet ;  Please find this link to helpful information about lowering your cholesterol: http://www.aafp.org/afp/2010/0501/p1103.html;

## 2023-08-17 NOTE — PATIENT INSTRUCTIONS
Current Outpatient Medications   Medication Sig Dispense Refill    buPROPion XL (Wellbutrin XL) 150 mg 24 hr tablet Take 1 tablet (150 mg) by mouth once daily in the morning. Take before meals. 90 tablet 1    cholecalciferol (Vitamin D-3) 50 mcg (2,000 unit) capsule Take 1 capsule (50 mcg) by mouth once daily.      cyanocobalamin, vitamin B-12, (Vitamin B-12) 1,000 mcg tablet extended release Take 1,000 mcg by mouth once daily.      fexofenadine (Allegra Allergy) 180 mg tablet Take by mouth once daily.      ibuprofen 400 mg tablet Take by mouth every 6 hours.      omeprazole 20 mg tablet,disintegrat, delay rel Take by mouth.      sertraline (Zoloft) 100 mg tablet Take 1 tablet (100 mg) by mouth once daily. 90 tablet 1    triamcinolone (Kenalog) 0.1 % ointment twice a day.       Current Facility-Administered Medications   Medication Dose Route Frequency Provider Last Rate Last Admin    cyanocobalamin (Vitamin B-12) injection 1,000 mcg  1,000 mcg subcutaneous Once Charlette Valladares MD         ou have high cholesterol. (hyperlipidemia). This increases your risk for cardiovascular disease.(Heart attack/stroke/circulation);  Continue any prescribed and advised OTC medications, in addition, as reminder:;   For high LDL (>130) use blueberries 1 cup daily;   Plant Stanol dose 950mg twice daily (may need to find online source for this dose), and ;    Metamucil/psyllium fiber 7 grams daily.;  ;  For high Triglycerides:;   Fish oil (start at 2000-3000mg daily);  ;   You should exercise 30 minutes daily. ;  ;   Your nutrition plan needs to emphasizes vegetables, fruits, and lean meat. I recommend the Mediterranean Diet ;  Please find this link to helpful information about lowering your cholesterol: http://www.aafp.org/afp/2010/0501/p1103.html;  Depression      What is depression?;  When doctors talk about depression, they mean the medical illness called major depression. Someone who has major depression has symptoms nearly every  "day, all day, for 2 weeks or longer. There is also a minor form of depression that causes less severe symptoms. Both kinds of depression have the same causes and treatment.;  Depression can affect people of all ages and is different for every person. A person who has depression can't control his or her feelings. If you or your child, teen, or older relative is depressed, it's not his or her fault.     Women are twice as likely as men to experience depression. The reason for this is unknown, but changes in a woman's hormone levels may be related to depression.     What are the symptoms of depression?;  The symptoms of depression are different for every person. You may have one or many of the symptoms listed below. Your symptoms may include only emotional or only physical symptoms, or both.;     Emotional symptoms  Crying easily or for no reason ;  Feeling guilty or worthless ;  Feeling restless, irritated, and easily annoyed ;  Feeling sad, numb, or hopeless ;  Losing interest or pleasure in things you used to enjoy (including sex) ;  Thinking about death or suicide;     Physical symptoms  Changes in appetite (eating more than usual, or eating less than usual) ;  Feeling very tired all the time ;  Having other aches and pains that don't get better with treatment ;  Having trouble paying attention, recalling things, concentrating, and making decisions ;  Headaches, backaches, or digestive problems ;  Sleeping too much, or having problems sleeping ;  Unintended weight loss or gain;  The symptoms of depression may be different for children, teens, and seniors.;     What causes depression?  Depression may be caused by an imbalance of chemicals in the brain. Sometimes there aren't enough chemical messengers (called neurotransmitters) in the brain. Examples of neurotransmitters that affect your mood are serotonin (say: \"sair-a-tone-in\"), norepinephrine (say: \"nor-ep-in-ef-rin\"), and dopamine (say: \"dope-a-mean\"). A " chemical imbalance in the brain may be caused by one or more of the following:;  Your genes. Sometimes depression is hereditary, meaning it runs in your family. If you have a parent or sibling who has depression, you may be more at risk for having depression yourself. ;  A medical condition. Problems with your thyroid, nutrient deficiencies, or chronic diseases such as heart disease, diabetes, or cancer may cause depression. ;  Events in your life. Depression can be triggered by stressful events in your life, such as the death of someone you love, a divorce, chronic illness, or loss of a job.   Medicines, drugs, or alcohol. Taking certain medicines, abusing drugs or alcohol, or having other illnesses can also lead to depression.;     Depression is not caused by personal weakness, laziness, or lack of willpower.;     Can giving birth cause depression?;  In the days following the birth of a baby, it is common for some mothers to have mood swings. They may feel a little depressed, have a hard time concentrating, lose their appetite, or find that they can't sleep well even when the baby is asleep. This is called the baby blues and goes away within 10 days after delivery. However, some women have worse symptoms or symptoms that last longer. This is called postpartum depression.     How is depression treated?  Depression can be treated with medicines, with counseling <[email or web address]-[email or web address]-[email or web address]-[email or web address]>, or with both. A nutritious diet, exercising on a regular basis, and avoiding alcohol, drugs, and too much caffeine can also help.;     How long will the depression last?  This depends on how soon you get help. Left untreated, depression can last for weeks, months, or even years. The main risk in not getting treatment is suicide. Treatment can help depression lift in 8 to 12 weeks or less.;     What medicines are used to treat  depression?;  Medicines that treat depression are called antidepressants. They help increase the number of chemical messengers (serotonin, norepinephrine, dopamine) in your brain.  Antidepressants work differently for different people. They also have different side effects. So, even if one medicine bothers you or doesn't work for you, another may help. You may notice improvement as soon as 1 week after you start taking the medicine. But you probably won't see the full effects for about 8 to 12 weeks. You may have side effects at first, but they tend to decrease after a couple of weeks. Don't stop taking the medicine without checking with your doctor first.;     How does counseling help?;   A combination of medicine and talk therapy is usually the most effective way of treating more severe depression. If you continue the combination treatment for at least a year, you are less likely to have depression come back.;you talk with a trained therapist or counselor about things that are going on in your life. The focus may be on your thoughts and beliefs, on things that happened in your past, or on your relationships. Or the focus may be on your behavior, how it's affecting you, and what you can do differently.      Tips on getting through depression  DO:  Pace yourself.   Get involved in activities that make you feel good or feel like you've achieved something.   Avoid drugs and alcohol. Both make depression worse. Both can cause dangerous side effects with antidepressant medicines.   Exercise often to make yourself feel better. Physical activity seems to cause a chemical reaction in the body that can improve your mood. Exercising 4 to 6 times a week for at least 30 minutes each time is a good goal. But even less activity can be helpful.   Eat balanced meals and healthful foods.   Get enough sleep.   Take your medicine and/or go to counseling as often as your doctor recommends. Your medicine won't work if you only take it  "once in a while.   Set small goals for yourself, because you may have less energy.   Encourage yourself.   Get as much information as you can about depression and how to treat it.   Call your doctor or the local suicide crisis center right away if you start thinking about suicide.  DON'T:  Don't isolate yourself. Stay in touch with your loved ones and friends, your Taoist advisor, and your family doctor.   Don't believe negative thoughts you may have, such as blaming yourself or expecting to fail. This thinking is part of depression. These thoughts will go away as your depression lifts.   Don't blame yourself for your depression. You didn't cause it.   Don't make major life decisions (for example, about separation or divorce). You may not be thinking clearly while you are depressed, so the decisions you make at this time may not be the best ones for you. If you must make a big decision, ask someone you trust to help you.   Don't expect to do everything you normally can. Set a realistic schedule.   Don't get discouraged. It will take time for your depression to lift fully. Be patient with yourself.   Don't give up.     READ: \"The Mindfulness and Acceptance Workbook for Depression\" by Collins and Alma  \"The Mindful Way Through Depression\" by Pillo Harrison Segal, and Cabot-Zinn        Suicide;  People who have depression sometimes think about suicide. This thinking is a common part of the depression. If you have thoughts about hurting yourself, tell someone. You could tell your doctor, your friends, your family, or call your local suicide hotline, such as the National Suicide Prevention Lifeline at 1-264.643.1144.;        F/U in 3 months for hyperlipidemia and depression    "

## 2023-09-18 ENCOUNTER — CLINICAL SUPPORT (OUTPATIENT)
Dept: PRIMARY CARE | Facility: CLINIC | Age: 33
End: 2023-09-18
Payer: COMMERCIAL

## 2023-09-18 DIAGNOSIS — E53.8 VITAMIN B 12 DEFICIENCY: ICD-10-CM

## 2023-09-18 PROCEDURE — 96372 THER/PROPH/DIAG INJ SC/IM: CPT | Performed by: FAMILY MEDICINE

## 2023-09-18 RX ORDER — CYANOCOBALAMIN 1000 UG/ML
1000 INJECTION, SOLUTION INTRAMUSCULAR; SUBCUTANEOUS ONCE
Status: COMPLETED | OUTPATIENT
Start: 2023-09-18 | End: 2023-09-18

## 2023-09-18 RX ADMIN — CYANOCOBALAMIN 1000 MCG: 1000 INJECTION, SOLUTION INTRAMUSCULAR; SUBCUTANEOUS at 09:14

## 2023-09-18 NOTE — PROGRESS NOTES
Pt presents for B12 injection per Dr Valladares. 1 mL given subcutaneously in upper R arm; no issues w/ injection. Pt tolerated well.

## 2023-10-19 ENCOUNTER — APPOINTMENT (OUTPATIENT)
Dept: RADIOLOGY | Facility: CLINIC | Age: 33
End: 2023-10-19
Payer: COMMERCIAL

## 2023-10-19 ENCOUNTER — APPOINTMENT (OUTPATIENT)
Dept: PRIMARY CARE | Facility: CLINIC | Age: 33
End: 2023-10-19
Payer: COMMERCIAL

## 2023-10-19 ENCOUNTER — OFFICE VISIT (OUTPATIENT)
Dept: PRIMARY CARE | Facility: CLINIC | Age: 33
End: 2023-10-19
Payer: COMMERCIAL

## 2023-10-19 VITALS
OXYGEN SATURATION: 97 % | BODY MASS INDEX: 27.78 KG/M2 | WEIGHT: 147 LBS | SYSTOLIC BLOOD PRESSURE: 126 MMHG | HEART RATE: 90 BPM | DIASTOLIC BLOOD PRESSURE: 86 MMHG

## 2023-10-19 DIAGNOSIS — E53.8 VITAMIN B 12 DEFICIENCY: ICD-10-CM

## 2023-10-19 DIAGNOSIS — R05.1 ACUTE COUGH: ICD-10-CM

## 2023-10-19 DIAGNOSIS — R09.81 SINUS CONGESTION: Primary | ICD-10-CM

## 2023-10-19 DIAGNOSIS — R09.82 POST-NASAL DRAINAGE: ICD-10-CM

## 2023-10-19 PROCEDURE — 99213 OFFICE O/P EST LOW 20 MIN: CPT | Performed by: STUDENT IN AN ORGANIZED HEALTH CARE EDUCATION/TRAINING PROGRAM

## 2023-10-19 PROCEDURE — 1036F TOBACCO NON-USER: CPT | Performed by: STUDENT IN AN ORGANIZED HEALTH CARE EDUCATION/TRAINING PROGRAM

## 2023-10-19 PROCEDURE — 96372 THER/PROPH/DIAG INJ SC/IM: CPT | Performed by: STUDENT IN AN ORGANIZED HEALTH CARE EDUCATION/TRAINING PROGRAM

## 2023-10-19 RX ORDER — CYANOCOBALAMIN 1000 UG/ML
1000 INJECTION, SOLUTION INTRAMUSCULAR; SUBCUTANEOUS ONCE
Status: COMPLETED | OUTPATIENT
Start: 2023-10-19 | End: 2023-10-19

## 2023-10-19 RX ORDER — METHYLPREDNISOLONE 4 MG/1
TABLET ORAL
Qty: 21 TABLET | Refills: 0 | Status: SHIPPED | OUTPATIENT
Start: 2023-10-19 | End: 2023-10-26

## 2023-10-19 RX ADMIN — CYANOCOBALAMIN 1000 MCG: 1000 INJECTION, SOLUTION INTRAMUSCULAR; SUBCUTANEOUS at 09:21

## 2023-10-19 ASSESSMENT — ENCOUNTER SYMPTOMS
COUGH: 1
DIARRHEA: 0
DYSURIA: 0
MYALGIAS: 0
VOMITING: 0
SINUS PRESSURE: 1
LIGHT-HEADEDNESS: 0
CHILLS: 0
NAUSEA: 0
RHINORRHEA: 1
ABDOMINAL PAIN: 0
FREQUENCY: 0
SINUS PAIN: 0
ARTHRALGIAS: 0
FATIGUE: 0
HEADACHES: 1
DIZZINESS: 0
CONSTIPATION: 0
FEVER: 0
SHORTNESS OF BREATH: 0

## 2023-10-19 NOTE — PROGRESS NOTES
Subjective   Patient ID: Ashley E Broadbent is a 33 y.o. female who presents for Sinusitis (2.5 weeks. Negative COVID 1 week ago) and B12 Injection.    Sinusitis  Associated symptoms include congestion, coughing (worse in the morning), headaches (sinus) and sinus pressure. Pertinent negatives include no chills (early on but resolved) or shortness of breath.        She has been having symptoms for the past 2 weeks or so.  She has been kind of improving and then getting worse again.  COVID testing negative.  Her son has started  a few weeks ago. No other sick contacts at home.  Advil cold and sinus, mucinex and then a saline spray. She has been taking her Allegra as prescribed.    Review of Systems   Constitutional:  Negative for chills (early on but resolved), fatigue and fever.   HENT:  Positive for congestion, postnasal drip, rhinorrhea and sinus pressure. Negative for sinus pain.    Respiratory:  Positive for cough (worse in the morning). Negative for shortness of breath.    Cardiovascular:  Negative for chest pain.   Gastrointestinal:  Negative for abdominal pain, constipation, diarrhea, nausea and vomiting.   Genitourinary:  Negative for dysuria and frequency.   Musculoskeletal:  Negative for arthralgias and myalgias.   Neurological:  Positive for headaches (sinus). Negative for dizziness and light-headedness.       Objective   /86   Pulse 90   Wt 66.7 kg (147 lb)   SpO2 97%   BMI 27.78 kg/m²     Physical Exam  Vitals and nursing note reviewed.   Constitutional:       General: She is not in acute distress.     Appearance: Normal appearance. She is normal weight. She is not ill-appearing or toxic-appearing.   HENT:      Head: Normocephalic and atraumatic.      Right Ear: Tympanic membrane, ear canal and external ear normal.      Left Ear: Tympanic membrane, ear canal and external ear normal.   Cardiovascular:      Rate and Rhythm: Normal rate and regular rhythm.      Heart sounds: Normal heart  sounds.   Pulmonary:      Effort: Pulmonary effort is normal.      Breath sounds: Normal breath sounds.   Neurological:      Mental Status: She is alert.         Assessment/Plan   Problem List Items Addressed This Visit    None  Visit Diagnoses         Codes    Sinus congestion    -  Primary R09.81    Relevant Medications    methylPREDNISolone (Medrol Dospak) 4 mg tablets    Post-nasal drainage     R09.82    Relevant Medications    methylPREDNISolone (Medrol Dospak) 4 mg tablets    Acute cough     R05.1    Relevant Medications    methylPREDNISolone (Medrol Dospak) 4 mg tablets          History and physical examination as above.  Patient presenting for increased sinus congestion and postnasal drainage along with a cough that is worse in the morning.  Likely viral.  Patient states her son recently started  and she has had this lingering on waxing and waning over the past 2 weeks.  No signs of active infection.  Given a Medrol Dosepak to help lessen symptoms.  Patient instructed to call back if things continue to worsen.  She is understanding and in agreement with this plan.

## 2023-10-24 ENCOUNTER — APPOINTMENT (OUTPATIENT)
Dept: RADIOLOGY | Facility: CLINIC | Age: 33
End: 2023-10-24
Payer: COMMERCIAL

## 2023-11-01 ENCOUNTER — ANCILLARY PROCEDURE (OUTPATIENT)
Dept: RADIOLOGY | Facility: CLINIC | Age: 33
End: 2023-11-01
Payer: COMMERCIAL

## 2023-11-01 DIAGNOSIS — E78.2 MIXED HYPERLIPIDEMIA: ICD-10-CM

## 2023-11-01 PROCEDURE — 75571 CT HRT W/O DYE W/CA TEST: CPT

## 2023-11-13 ENCOUNTER — APPOINTMENT (OUTPATIENT)
Dept: PRIMARY CARE | Facility: CLINIC | Age: 33
End: 2023-11-13
Payer: COMMERCIAL

## 2023-11-17 ENCOUNTER — LAB (OUTPATIENT)
Dept: LAB | Facility: LAB | Age: 33
End: 2023-11-17
Payer: COMMERCIAL

## 2023-11-17 ENCOUNTER — PATIENT MESSAGE (OUTPATIENT)
Dept: PRIMARY CARE | Facility: CLINIC | Age: 33
End: 2023-11-17

## 2023-11-17 ENCOUNTER — CLINICAL SUPPORT (OUTPATIENT)
Dept: PRIMARY CARE | Facility: CLINIC | Age: 33
End: 2023-11-17
Payer: COMMERCIAL

## 2023-11-17 DIAGNOSIS — E78.2 MIXED HYPERLIPIDEMIA: ICD-10-CM

## 2023-11-17 DIAGNOSIS — F33.9 CHRONIC MAJOR DEPRESSIVE DISORDER, RECURRENT EPISODE (CMS-HCC): ICD-10-CM

## 2023-11-17 DIAGNOSIS — E53.8 VITAMIN B 12 DEFICIENCY: ICD-10-CM

## 2023-11-17 DIAGNOSIS — R73.09 ABNORMAL GLUCOSE: ICD-10-CM

## 2023-11-17 DIAGNOSIS — E55.9 VITAMIN D DEFICIENCY: ICD-10-CM

## 2023-11-17 DIAGNOSIS — F41.1 GENERALIZED ANXIETY DISORDER: ICD-10-CM

## 2023-11-17 LAB
25(OH)D3 SERPL-MCNC: 48 NG/ML (ref 30–100)
ALBUMIN SERPL BCP-MCNC: 4.3 G/DL (ref 3.4–5)
ALP SERPL-CCNC: 55 U/L (ref 33–110)
ALT SERPL W P-5'-P-CCNC: 14 U/L (ref 7–45)
ANION GAP SERPL CALC-SCNC: 12 MMOL/L (ref 10–20)
AST SERPL W P-5'-P-CCNC: 16 U/L (ref 9–39)
BASOPHILS # BLD AUTO: 0.05 X10*3/UL (ref 0–0.1)
BASOPHILS NFR BLD AUTO: 0.7 %
BILIRUB SERPL-MCNC: 0.4 MG/DL (ref 0–1.2)
BUN SERPL-MCNC: 13 MG/DL (ref 6–23)
CALCIUM SERPL-MCNC: 9.5 MG/DL (ref 8.6–10.6)
CHLORIDE SERPL-SCNC: 102 MMOL/L (ref 98–107)
CHOLEST SERPL-MCNC: 238 MG/DL (ref 0–199)
CHOLESTEROL/HDL RATIO: 4.6
CO2 SERPL-SCNC: 28 MMOL/L (ref 21–32)
CREAT SERPL-MCNC: 0.65 MG/DL (ref 0.5–1.05)
EOSINOPHIL # BLD AUTO: 0.18 X10*3/UL (ref 0–0.7)
EOSINOPHIL NFR BLD AUTO: 2.5 %
ERYTHROCYTE [DISTWIDTH] IN BLOOD BY AUTOMATED COUNT: 13.2 % (ref 11.5–14.5)
EST. AVERAGE GLUCOSE BLD GHB EST-MCNC: 97 MG/DL
GFR SERPL CREATININE-BSD FRML MDRD: >90 ML/MIN/1.73M*2
GLUCOSE SERPL-MCNC: 83 MG/DL (ref 74–99)
HBA1C MFR BLD: 5 %
HCT VFR BLD AUTO: 43.3 % (ref 36–46)
HDLC SERPL-MCNC: 51.3 MG/DL
HGB BLD-MCNC: 13.9 G/DL (ref 12–16)
IMM GRANULOCYTES # BLD AUTO: 0.02 X10*3/UL (ref 0–0.7)
IMM GRANULOCYTES NFR BLD AUTO: 0.3 % (ref 0–0.9)
LDLC SERPL CALC-MCNC: 158 MG/DL
LYMPHOCYTES # BLD AUTO: 2.43 X10*3/UL (ref 1.2–4.8)
LYMPHOCYTES NFR BLD AUTO: 34.2 %
MAGNESIUM SERPL-MCNC: 2.07 MG/DL (ref 1.6–2.4)
MCH RBC QN AUTO: 29 PG (ref 26–34)
MCHC RBC AUTO-ENTMCNC: 32.1 G/DL (ref 32–36)
MCV RBC AUTO: 90 FL (ref 80–100)
MONOCYTES # BLD AUTO: 0.69 X10*3/UL (ref 0.1–1)
MONOCYTES NFR BLD AUTO: 9.7 %
NEUTROPHILS # BLD AUTO: 3.73 X10*3/UL (ref 1.2–7.7)
NEUTROPHILS NFR BLD AUTO: 52.6 %
NON HDL CHOLESTEROL: 187 MG/DL (ref 0–149)
NRBC BLD-RTO: 0 /100 WBCS (ref 0–0)
PLATELET # BLD AUTO: 316 X10*3/UL (ref 150–450)
POTASSIUM SERPL-SCNC: 4.4 MMOL/L (ref 3.5–5.3)
PROT SERPL-MCNC: 6.7 G/DL (ref 6.4–8.2)
RBC # BLD AUTO: 4.8 X10*6/UL (ref 4–5.2)
SODIUM SERPL-SCNC: 138 MMOL/L (ref 136–145)
TRIGL SERPL-MCNC: 145 MG/DL (ref 0–149)
VIT B12 SERPL-MCNC: >2000 PG/ML (ref 211–911)
VLDL: 29 MG/DL (ref 0–40)
WBC # BLD AUTO: 7.1 X10*3/UL (ref 4.4–11.3)

## 2023-11-17 PROCEDURE — 83036 HEMOGLOBIN GLYCOSYLATED A1C: CPT

## 2023-11-17 PROCEDURE — 80053 COMPREHEN METABOLIC PANEL: CPT

## 2023-11-17 PROCEDURE — 80061 LIPID PANEL: CPT

## 2023-11-17 PROCEDURE — 82607 VITAMIN B-12: CPT

## 2023-11-17 PROCEDURE — 36415 COLL VENOUS BLD VENIPUNCTURE: CPT

## 2023-11-17 PROCEDURE — 96372 THER/PROPH/DIAG INJ SC/IM: CPT | Performed by: FAMILY MEDICINE

## 2023-11-17 PROCEDURE — 83735 ASSAY OF MAGNESIUM: CPT

## 2023-11-17 PROCEDURE — 82306 VITAMIN D 25 HYDROXY: CPT

## 2023-11-17 PROCEDURE — 85025 COMPLETE CBC W/AUTO DIFF WBC: CPT

## 2023-11-17 RX ORDER — CYANOCOBALAMIN 1000 UG/ML
1000 INJECTION, SOLUTION INTRAMUSCULAR; SUBCUTANEOUS
Qty: 1 ML | Refills: 11 | Status: SHIPPED | OUTPATIENT
Start: 2023-11-17 | End: 2024-11-11

## 2023-11-17 RX ORDER — NEEDLES, SAFETY 22GX1 1/2"
1 NEEDLE, DISPOSABLE MISCELLANEOUS
Qty: 100 EACH | Refills: 1 | Status: SHIPPED | OUTPATIENT
Start: 2023-11-17

## 2023-11-17 RX ORDER — CYANOCOBALAMIN 1000 UG/ML
1000 INJECTION, SOLUTION INTRAMUSCULAR; SUBCUTANEOUS ONCE
Status: COMPLETED | OUTPATIENT
Start: 2023-11-17 | End: 2023-11-17

## 2023-11-17 RX ADMIN — CYANOCOBALAMIN 1000 MCG: 1000 INJECTION, SOLUTION INTRAMUSCULAR; SUBCUTANEOUS at 09:14

## 2023-11-17 NOTE — PROGRESS NOTES
Pt presents for monthly B12 injection per Dr Valladares. 1 mL given subcutaneously in upper R arm; no issues w/ injection. Pt tolerated well.

## 2023-11-27 ENCOUNTER — APPOINTMENT (OUTPATIENT)
Dept: PRIMARY CARE | Facility: CLINIC | Age: 33
End: 2023-11-27
Payer: COMMERCIAL

## 2024-01-11 ENCOUNTER — OFFICE VISIT (OUTPATIENT)
Dept: PRIMARY CARE | Facility: CLINIC | Age: 34
End: 2024-01-11
Payer: COMMERCIAL

## 2024-01-11 VITALS
DIASTOLIC BLOOD PRESSURE: 83 MMHG | OXYGEN SATURATION: 98 % | HEART RATE: 91 BPM | WEIGHT: 149.13 LBS | BODY MASS INDEX: 28.18 KG/M2 | SYSTOLIC BLOOD PRESSURE: 119 MMHG

## 2024-01-11 DIAGNOSIS — F33.9 RECURRENT MAJOR DEPRESSIVE DISORDER, REMISSION STATUS UNSPECIFIED (CMS-HCC): Primary | ICD-10-CM

## 2024-01-11 DIAGNOSIS — R41.840 IMPAIRED CONCENTRATION: ICD-10-CM

## 2024-01-11 DIAGNOSIS — F41.1 GENERALIZED ANXIETY DISORDER: ICD-10-CM

## 2024-01-11 PROCEDURE — 1036F TOBACCO NON-USER: CPT | Performed by: FAMILY MEDICINE

## 2024-01-11 PROCEDURE — 99214 OFFICE O/P EST MOD 30 MIN: CPT | Performed by: FAMILY MEDICINE

## 2024-01-11 RX ORDER — SERTRALINE HYDROCHLORIDE 100 MG/1
100 TABLET, FILM COATED ORAL DAILY
Qty: 90 TABLET | Refills: 1 | Status: SHIPPED | OUTPATIENT
Start: 2024-01-11

## 2024-01-11 RX ORDER — BUPROPION HYDROCHLORIDE 150 MG/1
150 TABLET ORAL
Qty: 90 TABLET | Refills: 1 | Status: SHIPPED | OUTPATIENT
Start: 2024-01-11

## 2024-01-11 ASSESSMENT — ENCOUNTER SYMPTOMS
FEVER: 0
SORE THROAT: 0
FLANK PAIN: 0
SLEEP DISTURBANCE: 0
BRUISES/BLEEDS EASILY: 0
NERVOUS/ANXIOUS: 0
OCCASIONAL FEELINGS OF UNSTEADINESS: 0
NECK STIFFNESS: 0
LOSS OF SENSATION IN FEET: 0
SINUS PRESSURE: 0
FATIGUE: 0
RHINORRHEA: 0
APPETITE CHANGE: 0
EYE DISCHARGE: 0
ADENOPATHY: 0
COUGH: 0
HEMATURIA: 0
BLOOD IN STOOL: 0
ACTIVITY CHANGE: 0
AGITATION: 0
BACK PAIN: 0
CHEST TIGHTNESS: 0
EYE PAIN: 0
SHORTNESS OF BREATH: 0
FREQUENCY: 0
SINUS PAIN: 0
POLYPHAGIA: 0
WHEEZING: 0
ARTHRALGIAS: 0
WOUND: 0
CONSTIPATION: 0
DIZZINESS: 0
EYE ITCHING: 0
DYSURIA: 0
MYALGIAS: 0
FACIAL SWELLING: 0
UNEXPECTED WEIGHT CHANGE: 0
JOINT SWELLING: 0
EYE REDNESS: 0
NAUSEA: 0
CHILLS: 0
POLYDIPSIA: 0
DEPRESSION: 0
COLOR CHANGE: 0
DIARRHEA: 0
VOMITING: 0
ABDOMINAL PAIN: 0
PALPITATIONS: 0
TROUBLE SWALLOWING: 0
VOICE CHANGE: 0
DIAPHORESIS: 0

## 2024-01-11 ASSESSMENT — PATIENT HEALTH QUESTIONNAIRE - PHQ9
SUM OF ALL RESPONSES TO PHQ9 QUESTIONS 1 AND 2: 2
2. FEELING DOWN, DEPRESSED OR HOPELESS: SEVERAL DAYS
10. IF YOU CHECKED OFF ANY PROBLEMS, HOW DIFFICULT HAVE THESE PROBLEMS MADE IT FOR YOU TO DO YOUR WORK, TAKE CARE OF THINGS AT HOME, OR GET ALONG WITH OTHER PEOPLE: SOMEWHAT DIFFICULT
1. LITTLE INTEREST OR PLEASURE IN DOING THINGS: SEVERAL DAYS

## 2024-01-11 NOTE — PATIENT INSTRUCTIONS
F/U in 3 months for physical and review for ADHD  Refer to counseling for ADHD workup.   Depression      What is depression?;  When doctors talk about depression, they mean the medical illness called major depression. Someone who has major depression has symptoms nearly every day, all day, for 2 weeks or longer. There is also a minor form of depression that causes less severe symptoms. Both kinds of depression have the same causes and treatment.;  Depression can affect people of all ages and is different for every person. A person who has depression can't control his or her feelings. If you or your child, teen, or older relative is depressed, it's not his or her fault.     Women are twice as likely as men to experience depression. The reason for this is unknown, but changes in a woman's hormone levels may be related to depression.     What are the symptoms of depression?;  The symptoms of depression are different for every person. You may have one or many of the symptoms listed below. Your symptoms may include only emotional or only physical symptoms, or both.;     Emotional symptoms  Crying easily or for no reason ;  Feeling guilty or worthless ;  Feeling restless, irritated, and easily annoyed ;  Feeling sad, numb, or hopeless ;  Losing interest or pleasure in things you used to enjoy (including sex) ;  Thinking about death or suicide;     Physical symptoms  Changes in appetite (eating more than usual, or eating less than usual) ;  Feeling very tired all the time ;  Having other aches and pains that don't get better with treatment ;  Having trouble paying attention, recalling things, concentrating, and making decisions ;  Headaches, backaches, or digestive problems ;  Sleeping too much, or having problems sleeping ;  Unintended weight loss or gain;  The symptoms of depression may be different for children, teens, and seniors.;     What causes depression?  Depression may be caused by an imbalance of chemicals in  "the brain. Sometimes there aren't enough chemical messengers (called neurotransmitters) in the brain. Examples of neurotransmitters that affect your mood are serotonin (say: \"sair-a-tone-in\"), norepinephrine (say: \"nor-ep-in-ef-rin\"), and dopamine (say: \"dope-a-mean\"). A chemical imbalance in the brain may be caused by one or more of the following:;  Your genes. Sometimes depression is hereditary, meaning it runs in your family. If you have a parent or sibling who has depression, you may be more at risk for having depression yourself. ;  A medical condition. Problems with your thyroid, nutrient deficiencies, or chronic diseases such as heart disease, diabetes, or cancer may cause depression. ;  Events in your life. Depression can be triggered by stressful events in your life, such as the death of someone you love, a divorce, chronic illness, or loss of a job.   Medicines, drugs, or alcohol. Taking certain medicines, abusing drugs or alcohol, or having other illnesses can also lead to depression.;     Depression is not caused by personal weakness, laziness, or lack of willpower.;     Can giving birth cause depression?;  In the days following the birth of a baby, it is common for some mothers to have mood swings. They may feel a little depressed, have a hard time concentrating, lose their appetite, or find that they can't sleep well even when the baby is asleep. This is called the baby blues and goes away within 10 days after delivery. However, some women have worse symptoms or symptoms that last longer. This is called postpartum depression.     How is depression treated?  Depression can be treated with medicines, with counseling <[email or web address]-[email or web address]-[email or web address]-[email or web address]-[email or web address]-[email or web address]>, or with both. A nutritious diet, exercising on a regular basis, and avoiding alcohol, drugs, and too much caffeine can also help.;     How long will the depression " last?  This depends on how soon you get help. Left untreated, depression can last for weeks, months, or even years. The main risk in not getting treatment is suicide. Treatment can help depression lift in 8 to 12 weeks or less.;     What medicines are used to treat depression?;  Medicines that treat depression are called antidepressants. They help increase the number of chemical messengers (serotonin, norepinephrine, dopamine) in your brain.  Antidepressants work differently for different people. They also have different side effects. So, even if one medicine bothers you or doesn't work for you, another may help. You may notice improvement as soon as 1 week after you start taking the medicine. But you probably won't see the full effects for about 8 to 12 weeks. You may have side effects at first, but they tend to decrease after a couple of weeks. Don't stop taking the medicine without checking with your doctor first.;     How does counseling help?;   A combination of medicine and talk therapy is usually the most effective way of treating more severe depression. If you continue the combination treatment for at least a year, you are less likely to have depression come back.;you talk with a trained therapist or counselor about things that are going on in your life. The focus may be on your thoughts and beliefs, on things that happened in your past, or on your relationships. Or the focus may be on your behavior, how it's affecting you, and what you can do differently.      Tips on getting through depression  DO:  Pace yourself.   Get involved in activities that make you feel good or feel like you've achieved something.   Avoid drugs and alcohol. Both make depression worse. Both can cause dangerous side effects with antidepressant medicines.   Exercise often to make yourself feel better. Physical activity seems to cause a chemical reaction in the body that can improve your mood. Exercising 4 to 6 times a week for at  "least 30 minutes each time is a good goal. But even less activity can be helpful.   Eat balanced meals and healthful foods.   Get enough sleep.   Take your medicine and/or go to counseling as often as your doctor recommends. Your medicine won't work if you only take it once in a while.   Set small goals for yourself, because you may have less energy.   Encourage yourself.   Get as much information as you can about depression and how to treat it.   Call your doctor or the local suicide crisis center right away if you start thinking about suicide.  DON'T:  Don't isolate yourself. Stay in touch with your loved ones and friends, your Mandaeism advisor, and your family doctor.   Don't believe negative thoughts you may have, such as blaming yourself or expecting to fail. This thinking is part of depression. These thoughts will go away as your depression lifts.   Don't blame yourself for your depression. You didn't cause it.   Don't make major life decisions (for example, about separation or divorce). You may not be thinking clearly while you are depressed, so the decisions you make at this time may not be the best ones for you. If you must make a big decision, ask someone you trust to help you.   Don't expect to do everything you normally can. Set a realistic schedule.   Don't get discouraged. It will take time for your depression to lift fully. Be patient with yourself.   Don't give up.     READ: \"The Mindfulness and Acceptance Workbook for Depression\" by Collins and Alma  \"The Mindful Way Through Depression\" by Pillo Harrison Segal, and Cabot-Zinn        Suicide;  People who have depression sometimes think about suicide. This thinking is a common part of the depression. If you have thoughts about hurting yourself, tell someone. You could tell your doctor, your friends, your family, or call your local suicide hotline, such as the National Suicide Prevention Lifeline at 1-176.659.9002.       "

## 2024-01-11 NOTE — PROGRESS NOTES
Subjective   Patient ID: Ashley E Broadbent is a 33 y.o. female who presents for Med Refill and Follow-up.  Today she is accompanied by accompanied by child.     Med Refill  Pertinent negatives include no abdominal pain, arthralgias, chest pain, chills, congestion, coughing, diaphoresis, fatigue, fever, joint swelling, myalgias, nausea, rash, sore throat or vomiting.     Depression  Visit Type: follow-up  Patient presents with the following symptoms: feelings of hopelessness.  Patient is not experiencing: anhedonia, chest pain, choking sensation, compulsions, confusion, decreased concentration, depressed mood, dizziness, dry mouth, excessive worry, fatigue, feelings of worthlessness, hypersomnia, hyperventilation, impotence, insomnia, irritability, malaise, memory impairment, muscle tension, nausea, nervousness/anxiety, obsessions, palpitations, panic, psychomotor agitation, psychomotor retardation, restlessness, shortness of breath, suicidal ideas, suicidal planning, thoughts of death, weight gain and weight loss.  Frequency of symptoms: occasionally   Severity: mild   Sleep quality: good  Nighttime awakenings: none  Compliance with medications:  %  Still concerned about adhd and her focus never tested for ADHD    Review of Systems   Constitutional:  Negative for activity change, appetite change, chills, diaphoresis, fatigue, fever and unexpected weight change.   HENT:  Negative for congestion, dental problem, drooling, ear discharge, ear pain, facial swelling, hearing loss, nosebleeds, postnasal drip, rhinorrhea, sinus pressure, sinus pain, sneezing, sore throat, tinnitus, trouble swallowing and voice change.    Eyes:  Negative for pain, discharge, redness, itching and visual disturbance.   Respiratory:  Negative for cough, chest tightness, shortness of breath and wheezing.    Cardiovascular:  Negative for chest pain, palpitations and leg swelling.   Gastrointestinal:  Negative for abdominal pain, blood in  stool, constipation, diarrhea, nausea and vomiting.   Endocrine: Negative for cold intolerance, heat intolerance, polydipsia, polyphagia and polyuria.   Genitourinary:  Negative for decreased urine volume, dysuria, flank pain, frequency, hematuria and urgency.   Musculoskeletal:  Negative for arthralgias, back pain, gait problem, joint swelling, myalgias and neck stiffness.   Skin:  Negative for color change, pallor, rash and wound.   Neurological:  Negative for dizziness.   Hematological:  Negative for adenopathy. Does not bruise/bleed easily.   Psychiatric/Behavioral:  Negative for agitation, behavioral problems and sleep disturbance. The patient is not nervous/anxious.        Objective   Blood Pressure 119/83   Pulse 91   Weight 67.6 kg (149 lb 2 oz)   Oxygen Saturation 98%   Body Mass Index 28.18 kg/m²   BSA: 1.71 meters squared  Growth percentiles: Facility age limit for growth %danuta is 20 years. Facility age limit for growth %danuta is 20 years.     Physical Exam  Vitals and nursing note reviewed.   Constitutional:       Appearance: Normal appearance.   HENT:      Head: Normocephalic.      Right Ear: Tympanic membrane normal.      Left Ear: Tympanic membrane normal.   Cardiovascular:      Rate and Rhythm: Normal rate and regular rhythm.      Pulses: Normal pulses.      Heart sounds: Normal heart sounds.   Abdominal:      General: Abdomen is flat. Bowel sounds are normal.   Musculoskeletal:         General: Normal range of motion.   Neurological:      Mental Status: She is alert.   Psychiatric:         Mood and Affect: Mood normal.         Behavior: Behavior normal.         Assessment/Plan   Problem List Items Addressed This Visit    None  Visit Diagnoses       Diagnosis Codes    Recurrent major depressive disorder, remission status unspecified (CMS/[facility])     F33.9    Relevant Medications    buPROPion XL (Wellbutrin XL) 150 mg 24 hr tablet    sertraline (Zoloft) 100 mg tablet            F/U in 3 months for  physical and review for ADHD  Refer to counseling for ADHD workup.   Depression      What is depression?;  When doctors talk about depression, they mean the medical illness called major depression. Someone who has major depression has symptoms nearly every day, all day, for 2 weeks or longer. There is also a minor form of depression that causes less severe symptoms. Both kinds of depression have the same causes and treatment.;  Depression can affect people of all ages and is different for every person. A person who has depression can't control his or her feelings. If you or your child, teen, or older relative is depressed, it's not his or her fault.     Women are twice as likely as men to experience depression. The reason for this is unknown, but changes in a woman's hormone levels may be related to depression.     What are the symptoms of depression?;  The symptoms of depression are different for every person. You may have one or many of the symptoms listed below. Your symptoms may include only emotional or only physical symptoms, or both.;     Emotional symptoms  Crying easily or for no reason ;  Feeling guilty or worthless ;  Feeling restless, irritated, and easily annoyed ;  Feeling sad, numb, or hopeless ;  Losing interest or pleasure in things you used to enjoy (including sex) ;  Thinking about death or suicide;     Physical symptoms  Changes in appetite (eating more than usual, or eating less than usual) ;  Feeling very tired all the time ;  Having other aches and pains that don't get better with treatment ;  Having trouble paying attention, recalling things, concentrating, and making decisions ;  Headaches, backaches, or digestive problems ;  Sleeping too much, or having problems sleeping ;  Unintended weight loss or gain;  The symptoms of depression may be different for children, teens, and seniors.;     What causes depression?  Depression may be caused by an imbalance of chemicals in the brain. Sometimes  "there aren't enough chemical messengers (called neurotransmitters) in the brain. Examples of neurotransmitters that affect your mood are serotonin (say: \"sair-a-tone-in\"), norepinephrine (say: \"nor-ep-in-ef-rin\"), and dopamine (say: \"dope-a-mean\"). A chemical imbalance in the brain may be caused by one or more of the following:;  Your genes. Sometimes depression is hereditary, meaning it runs in your family. If you have a parent or sibling who has depression, you may be more at risk for having depression yourself. ;  A medical condition. Problems with your thyroid, nutrient deficiencies, or chronic diseases such as heart disease, diabetes, or cancer may cause depression. ;  Events in your life. Depression can be triggered by stressful events in your life, such as the death of someone you love, a divorce, chronic illness, or loss of a job.   Medicines, drugs, or alcohol. Taking certain medicines, abusing drugs or alcohol, or having other illnesses can also lead to depression.;     Depression is not caused by personal weakness, laziness, or lack of willpower.;     Can giving birth cause depression?;  In the days following the birth of a baby, it is common for some mothers to have mood swings. They may feel a little depressed, have a hard time concentrating, lose their appetite, or find that they can't sleep well even when the baby is asleep. This is called the baby blues and goes away within 10 days after delivery. However, some women have worse symptoms or symptoms that last longer. This is called postpartum depression.     How is depression treated?  Depression can be treated with medicines, with counseling <[email or web address]>, or with both. A nutritious diet, exercising on a regular basis, and avoiding alcohol, drugs, and too much caffeine can also help.;     How long will the depression last?  This depends on " how soon you get help. Left untreated, depression can last for weeks, months, or even years. The main risk in not getting treatment is suicide. Treatment can help depression lift in 8 to 12 weeks or less.;     What medicines are used to treat depression?;  Medicines that treat depression are called antidepressants. They help increase the number of chemical messengers (serotonin, norepinephrine, dopamine) in your brain.  Antidepressants work differently for different people. They also have different side effects. So, even if one medicine bothers you or doesn't work for you, another may help. You may notice improvement as soon as 1 week after you start taking the medicine. But you probably won't see the full effects for about 8 to 12 weeks. You may have side effects at first, but they tend to decrease after a couple of weeks. Don't stop taking the medicine without checking with your doctor first.;     How does counseling help?;   A combination of medicine and talk therapy is usually the most effective way of treating more severe depression. If you continue the combination treatment for at least a year, you are less likely to have depression come back.;you talk with a trained therapist or counselor about things that are going on in your life. The focus may be on your thoughts and beliefs, on things that happened in your past, or on your relationships. Or the focus may be on your behavior, how it's affecting you, and what you can do differently.      Tips on getting through depression  DO:  Pace yourself.   Get involved in activities that make you feel good or feel like you've achieved something.   Avoid drugs and alcohol. Both make depression worse. Both can cause dangerous side effects with antidepressant medicines.   Exercise often to make yourself feel better. Physical activity seems to cause a chemical reaction in the body that can improve your mood. Exercising 4 to 6 times a week for at least 30 minutes each time  "is a good goal. But even less activity can be helpful.   Eat balanced meals and healthful foods.   Get enough sleep.   Take your medicine and/or go to counseling as often as your doctor recommends. Your medicine won't work if you only take it once in a while.   Set small goals for yourself, because you may have less energy.   Encourage yourself.   Get as much information as you can about depression and how to treat it.   Call your doctor or the local suicide crisis center right away if you start thinking about suicide.  DON'T:  Don't isolate yourself. Stay in touch with your loved ones and friends, your [employer] advisor, and your family doctor.   Don't believe negative thoughts you may have, such as blaming yourself or expecting to fail. This thinking is part of depression. These thoughts will go away as your depression lifts.   Don't blame yourself for your depression. You didn't cause it.   Don't make major life decisions (for example, about separation or divorce). You may not be thinking clearly while you are depressed, so the decisions you make at this time may not be the best ones for you. If you must make a big decision, ask someone you trust to help you.   Don't expect to do everything you normally can. Set a realistic schedule.   Don't get discouraged. It will take time for your depression to lift fully. Be patient with yourself.   Don't give up.     READ: \"The Mindfulness and Acceptance Workbook for Depression\" by Collins and Alma  \"The Mindful Way Through Depression\" by Pillo Harrison Segal, and Cabot-Zinn        Suicide;  People who have depression sometimes think about suicide. This thinking is a common part of the depression. If you have thoughts about hurting yourself, tell someone. You could tell your doctor, your friends, your family, or call your local suicide hotline, such as the National Suicide Prevention Lifeline at 1-769.425.6138.       "

## 2024-03-18 ENCOUNTER — APPOINTMENT (OUTPATIENT)
Dept: PRIMARY CARE | Facility: CLINIC | Age: 34
End: 2024-03-18
Payer: COMMERCIAL

## 2024-03-18 ASSESSMENT — LIFESTYLE VARIABLES: HISTORY_OF_SMOKING: I HAVE NEVER SMOKED

## 2024-03-20 ENCOUNTER — HOSPITAL ENCOUNTER (OUTPATIENT)
Dept: RADIOLOGY | Facility: CLINIC | Age: 34
Discharge: HOME | End: 2024-03-20
Payer: COMMERCIAL

## 2024-03-20 ENCOUNTER — OFFICE VISIT (OUTPATIENT)
Dept: PRIMARY CARE | Facility: CLINIC | Age: 34
End: 2024-03-20
Payer: COMMERCIAL

## 2024-03-20 VITALS
DIASTOLIC BLOOD PRESSURE: 86 MMHG | OXYGEN SATURATION: 97 % | BODY MASS INDEX: 28.01 KG/M2 | WEIGHT: 148.25 LBS | SYSTOLIC BLOOD PRESSURE: 124 MMHG | HEART RATE: 82 BPM

## 2024-03-20 DIAGNOSIS — R09.81 SINUS CONGESTION: ICD-10-CM

## 2024-03-20 DIAGNOSIS — J02.9 SORE THROAT: ICD-10-CM

## 2024-03-20 DIAGNOSIS — R06.2 WHEEZING: ICD-10-CM

## 2024-03-20 DIAGNOSIS — R05.1 ACUTE COUGH: ICD-10-CM

## 2024-03-20 DIAGNOSIS — R05.1 ACUTE COUGH: Primary | ICD-10-CM

## 2024-03-20 DIAGNOSIS — J02.9 PHARYNGITIS, UNSPECIFIED ETIOLOGY: ICD-10-CM

## 2024-03-20 PROCEDURE — 71046 X-RAY EXAM CHEST 2 VIEWS: CPT

## 2024-03-20 PROCEDURE — 99214 OFFICE O/P EST MOD 30 MIN: CPT | Performed by: FAMILY MEDICINE

## 2024-03-20 PROCEDURE — 87637 SARSCOV2&INF A&B&RSV AMP PRB: CPT

## 2024-03-20 PROCEDURE — 71046 X-RAY EXAM CHEST 2 VIEWS: CPT | Performed by: RADIOLOGY

## 2024-03-20 PROCEDURE — 1036F TOBACCO NON-USER: CPT | Performed by: FAMILY MEDICINE

## 2024-03-20 RX ORDER — FLUCONAZOLE 150 MG/1
150 TABLET ORAL ONCE
Qty: 1 TABLET | Refills: 0 | Status: SHIPPED | OUTPATIENT
Start: 2024-03-20 | End: 2024-03-20

## 2024-03-20 RX ORDER — AMOXICILLIN AND CLAVULANATE POTASSIUM 875; 125 MG/1; MG/1
875 TABLET, FILM COATED ORAL 2 TIMES DAILY
Qty: 20 TABLET | Refills: 0 | Status: SHIPPED | OUTPATIENT
Start: 2024-03-20 | End: 2024-03-30

## 2024-03-20 RX ORDER — PREDNISONE 20 MG/1
20 TABLET ORAL DAILY
Qty: 5 TABLET | Refills: 0 | Status: SHIPPED | OUTPATIENT
Start: 2024-03-20

## 2024-03-20 RX ORDER — ALBUTEROL SULFATE 90 UG/1
2 AEROSOL, METERED RESPIRATORY (INHALATION) EVERY 4 HOURS PRN
Qty: 8 G | Refills: 0 | Status: SHIPPED | OUTPATIENT
Start: 2024-03-20 | End: 2024-04-19

## 2024-03-20 ASSESSMENT — ENCOUNTER SYMPTOMS
SWOLLEN GLANDS: 0
CHILLS: 0
SORE THROAT: 0
HEADACHES: 1
SHORTNESS OF BREATH: 0
HOARSE VOICE: 1
SINUS PRESSURE: 1
COUGH: 1
NECK PAIN: 0
DIAPHORESIS: 0

## 2024-03-20 NOTE — PATIENT INSTRUCTIONS
"- Antibiotic sent to pharmacy  - Continue inhalers as prescribed  - Recommend you start flonase   - Steroid sent to pharmacy  - Advised patient to push fluids   - patient to call if develops new or worsening symptoms   Keep f/up as scheduled  Current Outpatient Medications   Medication Sig Dispense Refill    buPROPion XL (Wellbutrin XL) 150 mg 24 hr tablet Take 1 tablet (150 mg) by mouth once daily in the morning. Take before meals. 90 tablet 1    cholecalciferol (Vitamin D-3) 50 mcg (2,000 unit) capsule Take 1 capsule (50 mcg) by mouth once daily.      cyanocobalamin (Vitamin B-12) 1,000 mcg/mL injection Inject 1 mL (1,000 mcg) under the skin every 30 (thirty) days. 1 mL 11    fexofenadine (Allegra Allergy) 180 mg tablet Take by mouth once daily.      ibuprofen 400 mg tablet Take by mouth every 6 hours.      insulin syringe-needle U-100 (BD SafetyGlide Syringe) 1 mL 27 gauge x 5/8\" syringe 1 mL once daily. 100 each 1    omeprazole 20 mg tablet,disintegrat, delay rel Take by mouth.      sertraline (Zoloft) 100 mg tablet Take 1 tablet (100 mg) by mouth once daily. 90 tablet 1    triamcinolone (Kenalog) 0.1 % ointment twice a day.      albuterol (Ventolin HFA) 90 mcg/actuation inhaler Inhale 2 puffs every 4 hours if needed for wheezing or shortness of breath. 8 g 0    amoxicillin-pot clavulanate (Augmentin) 875-125 mg tablet Take 1 tablet (875 mg) by mouth 2 times a day for 10 days. 20 tablet 0    fluconazole (Diflucan) 150 mg tablet Take 1 tablet (150 mg) by mouth 1 time for 1 dose. 1 tablet 0    predniSONE (Deltasone) 20 mg tablet Take 1 tablet (20 mg) by mouth once daily. 5 tablet 0     No current facility-administered medications for this visit.       "

## 2024-03-20 NOTE — PROGRESS NOTES
Subjective   Patient ID: Ashley E Broadbent is a 33 y.o. female who presents for Sinusitis (Over 2 weeks. Recent sinus infection treatment).  Today she is accompanied by alone.     Sinusitis  This is a recurrent problem. The current episode started 1 to 4 weeks ago. The problem has been gradually worsening since onset. There has been no fever. Associated symptoms include congestion, coughing, ear pain, headaches, a hoarse voice and sinus pressure. Pertinent negatives include no chills, diaphoresis, neck pain, shortness of breath, sneezing, sore throat or swollen glands. Past treatments include saline nose sprays and oral decongestants (Zpak). The treatment provided mild relief.       Review of Systems   Constitutional:  Negative for chills and diaphoresis.   HENT:  Positive for congestion, ear pain, hoarse voice and sinus pressure. Negative for sneezing and sore throat.    Respiratory:  Positive for cough. Negative for shortness of breath.    Musculoskeletal:  Negative for neck pain.   Neurological:  Positive for headaches.       Objective   Blood Pressure 124/86   Pulse 82   Weight 67.2 kg (148 lb 4 oz)   Oxygen Saturation 97%   Body Mass Index 28.01 kg/m²   BSA: 1.7 meters squared  Growth percentiles: Facility age limit for growth %danuta is 20 years. Facility age limit for growth %danuta is 20 years.     Physical Exam  Vitals and nursing note reviewed.   Constitutional:       General: She is not in acute distress.     Appearance: Normal appearance. She is normal weight. She is not ill-appearing.   HENT:      Head: Normocephalic.      Right Ear: Tympanic membrane, ear canal and external ear normal. There is no impacted cerumen.      Left Ear: Tympanic membrane, ear canal and external ear normal. There is no impacted cerumen.      Nose: Congestion and rhinorrhea present.      Mouth/Throat:      Mouth: Mucous membranes are moist.      Pharynx: Posterior oropharyngeal erythema present. No oropharyngeal exudate.    Eyes:      General: No scleral icterus.        Right eye: No discharge.         Left eye: No discharge.      Extraocular Movements: Extraocular movements intact.      Conjunctiva/sclera: Conjunctivae normal.      Pupils: Pupils are equal, round, and reactive to light.   Cardiovascular:      Rate and Rhythm: Normal rate and regular rhythm.      Pulses: Normal pulses.      Heart sounds: Normal heart sounds. No murmur heard.  Pulmonary:      Effort: Pulmonary effort is normal. No respiratory distress.      Breath sounds: Wheezing present. No rhonchi or rales.   Musculoskeletal:         General: Normal range of motion.      Cervical back: Normal range of motion and neck supple. No rigidity or tenderness.      Right lower leg: No edema.      Left lower leg: No edema.   Lymphadenopathy:      Cervical: No cervical adenopathy.   Skin:     General: Skin is warm and dry.   Neurological:      Mental Status: She is alert and oriented to person, place, and time.   Psychiatric:         Mood and Affect: Mood normal.         Behavior: Behavior normal.         Thought Content: Thought content normal.         Judgment: Judgment normal.         Assessment/Plan   Problem List Items Addressed This Visit    None  Visit Diagnoses       Diagnosis Codes    Acute cough    -  Primary R05.1    Relevant Medications    amoxicillin-pot clavulanate (Augmentin) 875-125 mg tablet    fluconazole (Diflucan) 150 mg tablet    albuterol (Ventolin HFA) 90 mcg/actuation inhaler    predniSONE (Deltasone) 20 mg tablet    Other Relevant Orders    RSV PCR    Sars-CoV-2 PCR    Influenza A, and B PCR    XR chest 2 views    Sinus congestion     R09.81    Relevant Medications    amoxicillin-pot clavulanate (Augmentin) 875-125 mg tablet    fluconazole (Diflucan) 150 mg tablet    Other Relevant Orders    RSV PCR    Sars-CoV-2 PCR    Influenza A, and B PCR    XR chest 2 views    Sore throat     J02.9    Pharyngitis, unspecified etiology     J02.9    Relevant Orders  "   RSV PCR    Wheezing     R06.2    Relevant Medications    albuterol (Ventolin HFA) 90 mcg/actuation inhaler    predniSONE (Deltasone) 20 mg tablet          Current Outpatient Medications   Medication Sig Dispense Refill    buPROPion XL (Wellbutrin XL) 150 mg 24 hr tablet Take 1 tablet (150 mg) by mouth once daily in the morning. Take before meals. 90 tablet 1    cholecalciferol (Vitamin D-3) 50 mcg (2,000 unit) capsule Take 1 capsule (50 mcg) by mouth once daily.      cyanocobalamin (Vitamin B-12) 1,000 mcg/mL injection Inject 1 mL (1,000 mcg) under the skin every 30 (thirty) days. 1 mL 11    fexofenadine (Allegra Allergy) 180 mg tablet Take by mouth once daily.      ibuprofen 400 mg tablet Take by mouth every 6 hours.      insulin syringe-needle U-100 (BD SafetyGlide Syringe) 1 mL 27 gauge x 5/8\" syringe 1 mL once daily. 100 each 1    omeprazole 20 mg tablet,disintegrat, delay rel Take by mouth.      sertraline (Zoloft) 100 mg tablet Take 1 tablet (100 mg) by mouth once daily. 90 tablet 1    triamcinolone (Kenalog) 0.1 % ointment twice a day.      albuterol (Ventolin HFA) 90 mcg/actuation inhaler Inhale 2 puffs every 4 hours if needed for wheezing or shortness of breath. 8 g 0    amoxicillin-pot clavulanate (Augmentin) 875-125 mg tablet Take 1 tablet (875 mg) by mouth 2 times a day for 10 days. 20 tablet 0    fluconazole (Diflucan) 150 mg tablet Take 1 tablet (150 mg) by mouth 1 time for 1 dose. 1 tablet 0    predniSONE (Deltasone) 20 mg tablet Take 1 tablet (20 mg) by mouth once daily. 5 tablet 0     No current facility-administered medications for this visit.         - Antibiotic sent to pharmacy  - Continue inhalers as prescribed  - Recommend you start flonase   - Steroid sent to pharmacy  - Advised patient to push fluids   - patient to call if develops new or worsening symptoms     "

## 2024-03-21 LAB
FLUAV RNA RESP QL NAA+PROBE: NOT DETECTED
FLUBV RNA RESP QL NAA+PROBE: NOT DETECTED
RSV RNA RESP QL NAA+PROBE: NOT DETECTED
SARS-COV-2 RNA RESP QL NAA+PROBE: NOT DETECTED

## 2024-04-13 NOTE — PATIENT INSTRUCTIONS
F/U 4-6 weeks for depression.    If you experience any concerning side effects, please call.  Increased Sertraline to 100mg Daily  Medication refills sent to [facility]  Current Outpatient Medications   Medication Sig Dispense Refill    cholecalciferol (Vitamin D-3) 50 mcg (2,000 unit) capsule Take 1 capsule (50 mcg) by mouth once daily.      cyanocobalamin, vitamin B-12, (Vitamin B-12) 1,000 mcg tablet extended release Take 1,000 mcg by mouth once daily.      fexofenadine (Allegra Allergy) 180 mg tablet Take by mouth once daily.      ibuprofen 400 mg tablet Take by mouth every 6 hours.      omeprazole 20 mg tablet,disintegrat, delay rel Take by mouth.      triamcinolone (Kenalog) 0.1 % ointment twice a day.      buPROPion XL (Wellbutrin XL) 150 mg 24 hr tablet Take 1 tablet (150 mg) by mouth once daily in the morning. Take before meals. 90 tablet 1    sertraline (Zoloft) 100 mg tablet Take 1 tablet (100 mg) by mouth once daily. 90 tablet 1     No current facility-administered medications for this visit.     Depression      What is depression?;  When doctors talk about depression, they mean the medical illness called major depression. Someone who has major depression has symptoms nearly every day, all day, for 2 weeks or longer. There is also a minor form of depression that causes less severe symptoms. Both kinds of depression have the same causes and treatment.;  Depression can affect people of all ages and is different for every person. A person who has depression can't control his or her feelings. If you or your child, teen, or older relative is depressed, it's not his or her fault.     Women are twice as likely as men to experience depression. The reason for this is unknown, but changes in a woman's hormone levels may be related to depression.     What are the symptoms of depression?;  The symptoms of depression are different for every person. You may have one or many of the symptoms listed below. Your symptoms  "may include only emotional or only physical symptoms, or both.;     Emotional symptoms  Crying easily or for no reason ;  Feeling guilty or worthless ;  Feeling restless, irritated, and easily annoyed ;  Feeling sad, numb, or hopeless ;  Losing interest or pleasure in things you used to enjoy (including sex) ;  Thinking about death or suicide;     Physical symptoms  Changes in appetite (eating more than usual, or eating less than usual) ;  Feeling very tired all the time ;  Having other aches and pains that don't get better with treatment ;  Having trouble paying attention, recalling things, concentrating, and making decisions ;  Headaches, backaches, or digestive problems ;  Sleeping too much, or having problems sleeping ;  Unintended weight loss or gain;  The symptoms of depression may be different for children, teens, and seniors.;     What causes depression?  Depression may be caused by an imbalance of chemicals in the brain. Sometimes there aren't enough chemical messengers (called neurotransmitters) in the brain. Examples of neurotransmitters that affect your mood are serotonin (say: \"sair-a-tone-in\"), norepinephrine (say: \"nor-ep-in-ef-rin\"), and dopamine (say: \"dope-a-mean\"). A chemical imbalance in the brain may be caused by one or more of the following:;  Your genes. Sometimes depression is hereditary, meaning it runs in your family. If you have a parent or sibling who has depression, you may be more at risk for having depression yourself. ;  A medical condition. Problems with your thyroid, nutrient deficiencies, or chronic diseases such as heart disease, diabetes, or cancer may cause depression. ;  Events in your life. Depression can be triggered by stressful events in your life, such as the death of someone you love, a divorce, chronic illness, or loss of a job.   Medicines, drugs, or alcohol. Taking certain medicines, abusing drugs or alcohol, or having other illnesses can also lead to depression.;   "   Depression is not caused by personal weakness, laziness, or lack of willpower.;     Can giving birth cause depression?;  In the days following the birth of a baby, it is common for some mothers to have mood swings. They may feel a little depressed, have a hard time concentrating, lose their appetite, or find that they can't sleep well even when the baby is asleep. This is called the baby blues and goes away within 10 days after delivery. However, some women have worse symptoms or symptoms that last longer. This is called postpartum depression.     How is depression treated?  Depression can be treated with medicines, with counseling <http://familydoctor.org/familydoctor/en/prevention-wellness/emotional-wellbeing/mental-health/therapy-and-counseling.html>, or with both. A nutritious diet, exercising on a regular basis, and avoiding alcohol, drugs, and too much caffeine can also help.;     How long will the depression last?  This depends on how soon you get help. Left untreated, depression can last for weeks, months, or even years. The main risk in not getting treatment is suicide. Treatment can help depression lift in 8 to 12 weeks or less.;     What medicines are used to treat depression?;  Medicines that treat depression are called antidepressants. They help increase the number of chemical messengers (serotonin, norepinephrine, dopamine) in your brain.  Antidepressants work differently for different people. They also have different side effects. So, even if one medicine bothers you or doesn't work for you, another may help. You may notice improvement as soon as 1 week after you start taking the medicine. But you probably won't see the full effects for about 8 to 12 weeks. You may have side effects at first, but they tend to decrease after a couple of weeks. Don't stop taking the medicine without checking with your doctor first.;     How does counseling help?;   A combination of medicine and talk therapy is usually  the most effective way of treating more severe depression. If you continue the combination treatment for at least a year, you are less likely to have depression come back.;you talk with a trained therapist or counselor about things that are going on in your life. The focus may be on your thoughts and beliefs, on things that happened in your past, or on your relationships. Or the focus may be on your behavior, how it's affecting you, and what you can do differently.      Tips on getting through depression  DO:  Pace yourself.   Get involved in activities that make you feel good or feel like you've achieved something.   Avoid drugs and alcohol. Both make depression worse. Both can cause dangerous side effects with antidepressant medicines.   Exercise often to make yourself feel better. Physical activity seems to cause a chemical reaction in the body that can improve your mood. Exercising 4 to 6 times a week for at least 30 minutes each time is a good goal. But even less activity can be helpful.   Eat balanced meals and healthful foods.   Get enough sleep.   Take your medicine and/or go to counseling as often as your doctor recommends. Your medicine won't work if you only take it once in a while.   Set small goals for yourself, because you may have less energy.   Encourage yourself.   Get as much information as you can about depression and how to treat it.   Call your doctor or the local suicide crisis center right away if you start thinking about suicide.  DON'T:  Don't isolate yourself. Stay in touch with your loved ones and friends, your Yazdanism advisor, and your family doctor.   Don't believe negative thoughts you may have, such as blaming yourself or expecting to fail. This thinking is part of depression. These thoughts will go away as your depression lifts.   Don't blame yourself for your depression. You didn't cause it.   Don't make major life decisions (for example, about separation or divorce). You may not  "be thinking clearly while you are depressed, so the decisions you make at this time may not be the best ones for you. If you must make a big decision, ask someone you trust to help you.   Don't expect to do everything you normally can. Set a realistic schedule.   Don't get discouraged. It will take time for your depression to lift fully. Be patient with yourself.   Don't give up.     READ: \"The Mindfulness and Acceptance Workbook for Depression\" by Collins and Alma  \"The Mindful Way Through Depression\" by Pillo Harrison Segal, and Cabot-Zinn        Suicide;  People who have depression sometimes think about suicide. This thinking is a common part of the depression. If you have thoughts about hurting yourself, tell someone. You could tell your doctor, your friends, your family, or call your local suicide hotline, such as the National Suicide Prevention Lifeline at 1-688.292.9927.;    " 100

## 2024-04-18 ENCOUNTER — APPOINTMENT (OUTPATIENT)
Dept: PRIMARY CARE | Facility: CLINIC | Age: 34
End: 2024-04-18
Payer: COMMERCIAL

## 2024-05-09 ENCOUNTER — APPOINTMENT (OUTPATIENT)
Dept: PRIMARY CARE | Facility: CLINIC | Age: 34
End: 2024-05-09
Payer: COMMERCIAL

## 2024-07-22 ENCOUNTER — TELEPHONE (OUTPATIENT)
Dept: PRIMARY CARE | Facility: CLINIC | Age: 34
End: 2024-07-22
Payer: COMMERCIAL

## 2024-07-22 DIAGNOSIS — E55.9 VITAMIN D DEFICIENCY: ICD-10-CM

## 2024-07-22 DIAGNOSIS — Z13.29 THYROID DISORDER SCREEN: ICD-10-CM

## 2024-07-22 DIAGNOSIS — E53.8 VITAMIN B 12 DEFICIENCY: ICD-10-CM

## 2024-07-22 DIAGNOSIS — Z00.00 ENCOUNTER FOR ANNUAL GENERAL MEDICAL EXAMINATION WITHOUT ABNORMAL FINDINGS IN ADULT: ICD-10-CM

## 2024-08-03 DIAGNOSIS — F33.9 RECURRENT MAJOR DEPRESSIVE DISORDER, REMISSION STATUS UNSPECIFIED (CMS-HCC): ICD-10-CM

## 2024-08-05 RX ORDER — BUPROPION HYDROCHLORIDE 150 MG/1
150 TABLET ORAL
Qty: 90 TABLET | Refills: 0 | Status: SHIPPED | OUTPATIENT
Start: 2024-08-05

## 2024-08-07 DIAGNOSIS — F33.9 RECURRENT MAJOR DEPRESSIVE DISORDER, REMISSION STATUS UNSPECIFIED (CMS-HCC): ICD-10-CM

## 2024-08-07 RX ORDER — SERTRALINE HYDROCHLORIDE 100 MG/1
100 TABLET, FILM COATED ORAL DAILY
Qty: 90 TABLET | Refills: 1 | Status: SHIPPED | OUTPATIENT
Start: 2024-08-07

## 2024-09-11 ENCOUNTER — OFFICE VISIT (OUTPATIENT)
Dept: URGENT CARE | Age: 34
End: 2024-09-11
Payer: COMMERCIAL

## 2024-09-11 VITALS
OXYGEN SATURATION: 98 % | RESPIRATION RATE: 18 BRPM | HEART RATE: 102 BPM | SYSTOLIC BLOOD PRESSURE: 150 MMHG | BODY MASS INDEX: 27.4 KG/M2 | TEMPERATURE: 98.3 F | DIASTOLIC BLOOD PRESSURE: 86 MMHG | WEIGHT: 145 LBS

## 2024-09-11 DIAGNOSIS — J01.41 ACUTE RECURRENT PANSINUSITIS: Primary | ICD-10-CM

## 2024-09-11 RX ORDER — AMOXICILLIN AND CLAVULANATE POTASSIUM 875; 125 MG/1; MG/1
1 TABLET, FILM COATED ORAL EVERY 12 HOURS
Qty: 20 TABLET | Refills: 0 | Status: SHIPPED | OUTPATIENT
Start: 2024-09-11 | End: 2024-09-21

## 2024-09-11 RX ORDER — FLUCONAZOLE 150 MG/1
150 TABLET ORAL DAILY
Qty: 2 TABLET | Refills: 0 | Status: SHIPPED | OUTPATIENT
Start: 2024-09-11

## 2024-09-11 NOTE — PROGRESS NOTES
Subjective   Patient ID: Ashley E Broadbent is a 34 y.o. female. They present today with a chief complaint of Sinus Problem (Sinus pressure, ear pressure, sore throat x 1 week).    History of Present Illness  HPI  OHF with PMH recurrent sinusitis, none recently, as well as AOM. Presents for 9d of worsening sinus pain, pressure, PND, ST, all worsening. Denies cough, dyspnea, HA, f/c/s. Home COVID-19 negative.    Past Medical History  Allergies as of 09/11/2024 - Reviewed 09/11/2024   Allergen Reaction Noted    Prochlorperazine Unknown 07/10/2023       (Not in a hospital admission)       Past Medical History:   Diagnosis Date    Abnormal weight loss 05/19/2020    Weight loss, unintentional    ADHD (attention deficit hyperactivity disorder) 2022    Allergic     Allergic rhinitis 08/17/2023    Allergic rhinitis, unspecified 08/13/2021    Allergic sinusitis    Anxiety     Atopic eczema 08/17/2023    Cellulitis of left lower limb 05/06/2015    Cellulitis of left thigh    Depression     Deviated nasal septum 04/21/2014    Nasal septal deviation    Gastritis, unspecified, with bleeding 04/21/2014    Gastritis, hemorrhagic    Irregular periods 03/23/2022    Jaw pain 04/21/2015    Pain in mandible    Night sweats 08/17/2023    Palpitation 08/17/2023    Personal history of other diseases of the respiratory system 12/19/2016    History of acute bronchitis    Personal history of other diseases of the respiratory system 03/12/2016    History of acute bacterial sinusitis    Personal history of other specified conditions 06/09/2014    History of lymphadenopathy    Personal history of peptic ulcer disease 04/21/2014    History of peptic ulcer    Personal history of traumatic brain injury 04/21/2014    History of concussion    Reaction to severe stress, unspecified 04/21/2014    Situational stress       Past Surgical History:   Procedure Laterality Date    RHINOPLASTY  04/21/2014    Rhinoplasty    SEPTOPLASTY  04/21/2014     Septoplasty    SINUS SURGERY  2009        reports that she has never smoked. She has never been exposed to tobacco smoke. She has never used smokeless tobacco. She reports current alcohol use of about 1.0 standard drink of alcohol per week. She reports that she does not use drugs.    Review of Systems  Review of Systems     Negative except as in HPI.                            Objective    Vitals:    09/11/24 0957   BP: 150/86   Pulse: 102   Resp: 18   Temp: 36.8 °C (98.3 °F)   SpO2: 98%   Weight: 65.8 kg (145 lb)     No LMP recorded.    Physical Exam  CONSTITUTIONAL: well-appearing, nontoxic         ENT:  Head and face are unremarkable and atraumatic. Mucous membranes moist.    Pan sinus TTP    * Oropharynx shows PND    * No uvular deviation. No visible abscess.    * Lymphadenopathy    * Tms bl show serous effusion. Nl EAC bl.         LUNGS:  CTAB, no r/r/w.    CARDIOVASCULAR:   RRR, no m/r/g. Nl S1/S2.    ABDOMEN:  Nontender including left upper quadrant, nondistended, no acute abdomen.     MUSCULOSKELETAL: No obvious deformities. REED with equal strength. Gait normal.    SKIN:   Warm and dry with no rashes.    NEURO:  Normal baseline mental status.    PSYCH: Appropriate mood and affect.         ------------------------------------------         MDM: Likely ABRS given objective signs and duration. Rx Augmentin sent, and will fu here or at PCP PRN if unresolved.    Procedures    Point of Care Test & Imaging Results from this visit  Results for orders placed or performed in visit on 03/20/24   RSV PCR   Result Value Ref Range    RSV PCR Not Detected Not Detected   Sars-CoV-2 PCR   Result Value Ref Range    Coronavirus 2019, PCR Not Detected Not Detected   Influenza A, and B PCR   Result Value Ref Range    Flu A Result Not Detected Not Detected    Flu B Result Not Detected Not Detected     No results found.    Diagnostic study results (if any) were reviewed by Jose Painter PA-C.    Assessment/Plan   Allergies,  medications, history, and pertinent labs/EKGs/Imaging reviewed by Jose Painter PA-C.       Orders and Diagnoses  There are no diagnoses linked to this encounter.    Medical Admin Record      Follow Up Instructions  No follow-ups on file.    Patient disposition: Home    Electronically signed by Jose Painter PA-C  10:25 AM

## 2024-11-07 ENCOUNTER — PATIENT MESSAGE (OUTPATIENT)
Dept: PRIMARY CARE | Facility: CLINIC | Age: 34
End: 2024-11-07
Payer: COMMERCIAL

## 2024-11-07 DIAGNOSIS — F33.9 RECURRENT MAJOR DEPRESSIVE DISORDER, REMISSION STATUS UNSPECIFIED (CMS-HCC): ICD-10-CM

## 2024-11-07 RX ORDER — BUPROPION HYDROCHLORIDE 150 MG/1
150 TABLET ORAL
Qty: 90 TABLET | Refills: 0 | Status: SHIPPED | OUTPATIENT
Start: 2024-11-07

## 2024-11-11 ENCOUNTER — APPOINTMENT (OUTPATIENT)
Dept: PRIMARY CARE | Facility: CLINIC | Age: 34
End: 2024-11-11
Payer: COMMERCIAL

## 2024-11-26 ENCOUNTER — TELEPHONE (OUTPATIENT)
Dept: PRIMARY CARE | Facility: CLINIC | Age: 34
End: 2024-11-26
Payer: COMMERCIAL

## 2024-11-26 DIAGNOSIS — E53.8 VITAMIN B 12 DEFICIENCY: Primary | ICD-10-CM

## 2024-11-26 RX ORDER — CYANOCOBALAMIN 1000 UG/ML
1000 INJECTION, SOLUTION INTRAMUSCULAR; SUBCUTANEOUS
COMMUNITY
Start: 2024-11-06 | End: 2024-11-26 | Stop reason: SDUPTHER

## 2024-11-26 RX ORDER — CYANOCOBALAMIN 1000 UG/ML
1000 INJECTION, SOLUTION INTRAMUSCULAR; SUBCUTANEOUS
Qty: 1 ML | Refills: 0 | Status: SHIPPED | OUTPATIENT
Start: 2024-11-26

## 2024-11-26 NOTE — TELEPHONE ENCOUNTER
Fax refill request from [facility] for    cyanocobalamin (Vitamin B-12) 1,000 mcg/mL injection    Inject 1 mL (1,000 mcg) under the skin every 30 (thirty) days.   Quantity: 1 mL     [facility]  [id number] [address]  [address] [place] [address]  Phone: [phone]  Fax: [phone]

## 2025-01-13 ENCOUNTER — APPOINTMENT (OUTPATIENT)
Dept: PRIMARY CARE | Facility: CLINIC | Age: 35
End: 2025-01-13
Payer: COMMERCIAL

## 2025-01-30 ENCOUNTER — APPOINTMENT (OUTPATIENT)
Dept: PRIMARY CARE | Facility: CLINIC | Age: 35
End: 2025-01-30
Payer: COMMERCIAL

## 2025-02-18 DIAGNOSIS — F33.9 RECURRENT MAJOR DEPRESSIVE DISORDER, REMISSION STATUS UNSPECIFIED (CMS-HCC): ICD-10-CM

## 2025-02-18 RX ORDER — BUPROPION HYDROCHLORIDE 150 MG/1
150 TABLET ORAL
Qty: 90 TABLET | Refills: 0 | Status: SHIPPED | OUTPATIENT
Start: 2025-02-18

## 2025-02-18 RX ORDER — SERTRALINE HYDROCHLORIDE 100 MG/1
100 TABLET, FILM COATED ORAL DAILY
Qty: 90 TABLET | Refills: 1 | Status: SHIPPED | OUTPATIENT
Start: 2025-02-18

## 2025-04-07 ENCOUNTER — APPOINTMENT (OUTPATIENT)
Dept: PRIMARY CARE | Facility: CLINIC | Age: 35
End: 2025-04-07
Payer: COMMERCIAL

## 2025-04-07 VITALS
BODY MASS INDEX: 30.17 KG/M2 | TEMPERATURE: 99.6 F | HEART RATE: 88 BPM | WEIGHT: 159.8 LBS | HEIGHT: 61 IN | DIASTOLIC BLOOD PRESSURE: 73 MMHG | SYSTOLIC BLOOD PRESSURE: 117 MMHG | OXYGEN SATURATION: 94 %

## 2025-04-07 DIAGNOSIS — Z00.00 ENCOUNTER FOR ANNUAL GENERAL MEDICAL EXAMINATION WITHOUT ABNORMAL FINDINGS IN ADULT: Primary | ICD-10-CM

## 2025-04-07 DIAGNOSIS — F41.1 GENERALIZED ANXIETY DISORDER: ICD-10-CM

## 2025-04-07 DIAGNOSIS — L70.0 CYSTIC ACNE VULGARIS: ICD-10-CM

## 2025-04-07 DIAGNOSIS — F33.9 CHRONIC MAJOR DEPRESSIVE DISORDER, RECURRENT EPISODE (CMS-HCC): ICD-10-CM

## 2025-04-07 DIAGNOSIS — E55.9 VITAMIN D DEFICIENCY: ICD-10-CM

## 2025-04-07 DIAGNOSIS — L20.82 FLEXURAL ECZEMA: Chronic | ICD-10-CM

## 2025-04-07 DIAGNOSIS — E53.8 VITAMIN B 12 DEFICIENCY: ICD-10-CM

## 2025-04-07 PROCEDURE — 99395 PREV VISIT EST AGE 18-39: CPT | Performed by: FAMILY MEDICINE

## 2025-04-07 PROCEDURE — 1036F TOBACCO NON-USER: CPT | Performed by: FAMILY MEDICINE

## 2025-04-07 PROCEDURE — 3008F BODY MASS INDEX DOCD: CPT | Performed by: FAMILY MEDICINE

## 2025-04-07 RX ORDER — LANOLIN ALCOHOL/MO/W.PET/CERES
1000 CREAM (GRAM) TOPICAL DAILY
COMMUNITY

## 2025-04-07 RX ORDER — TRETINOIN 0.25 MG/G
CREAM TOPICAL NIGHTLY
Qty: 45 G | Refills: 0 | Status: SHIPPED | OUTPATIENT
Start: 2025-04-07

## 2025-04-07 RX ORDER — OMEPRAZOLE 20 MG/1
20 CAPSULE, DELAYED RELEASE ORAL 2 TIMES DAILY PRN
COMMUNITY

## 2025-04-07 ASSESSMENT — ENCOUNTER SYMPTOMS
DEPRESSION: 0
LOSS OF SENSATION IN FEET: 0
OCCASIONAL FEELINGS OF UNSTEADINESS: 0

## 2025-04-07 ASSESSMENT — PATIENT HEALTH QUESTIONNAIRE - PHQ9
SUM OF ALL RESPONSES TO PHQ9 QUESTIONS 1 AND 2: 0
2. FEELING DOWN, DEPRESSED OR HOPELESS: NOT AT ALL
1. LITTLE INTEREST OR PLEASURE IN DOING THINGS: NOT AT ALL

## 2025-04-07 NOTE — PATIENT INSTRUCTIONS
Specialty Consultation notes reviewed  Labs to be done  Refills sent in  F/U in 6 months for depression  Continue current medications  Call if any new concerns    Current Outpatient Medications   Medication Sig Dispense Refill    buPROPion XL (Wellbutrin XL) 150 mg 24 hr tablet Take 1 tablet (150 mg) by mouth once daily in the morning. Take before meals. 90 tablet 0    cholecalciferol (Vitamin D-3) 50 mcg (2,000 unit) capsule Take 1 capsule (50 mcg) by mouth once daily.      fexofenadine (Allegra Allergy) 180 mg tablet Take by mouth once daily.      ibuprofen 400 mg tablet Take by mouth every 6 hours.      sertraline (Zoloft) 100 mg tablet Take 1 tablet (100 mg) by mouth once daily. 90 tablet 1    triamcinolone (Kenalog) 0.1 % ointment twice a day.      cyanocobalamin (Vitamin B-12) 1,000 mcg tablet Take 1 tablet (1,000 mcg) by mouth once daily.      omeprazole (PriLOSEC) 20 mg DR capsule Take 1 capsule (20 mg) by mouth 2 times a day as needed. Do not crush or chew.       No current facility-administered medications for this visit.

## 2025-04-07 NOTE — PROGRESS NOTES
Subjective   Patient ID: Ashley E Broadbent is a 34 y.o. female who presents for Annual Exam.  Today she is accompanied by alone.     HPI  Ashley E Broadbent is a 34 y.o. female who is presenting for annual physical exam.     Last  Visit:   Reported Health: Good    Dental, Vision, Hearing:  Regular dental visits: yes  - Brushes teeth 2 times per day  Vision problems: no  - Wears glasses or contacts? yes  - Last eye exam:    Hearing loss: no    Immunization status:  Up to date: yes    Lifestyle:  Healthy diet: yes  Regular exercise: yes  - Exercise frequency: jogging and light weights 2 times a week  - Type of exercise: jogging and light weights  Alcohol: yes  Tobacco: no  Drugs: no    Reproductive Health:  Menstrual problems: no  - LMP:  2025  Sexually active: yes  Contraception: condoms    Pregnancy History:   : 1  Parity: 1  - Full term: 1    Cervical Cancer Screening:  Last pap:  2020  History of abnormal pap smear? no  Breast Cancer Screening:  Last mammogram:  n/a  Colorectal Cancer Screening:  Last colonoscopy or Cologuard:  n/a  Metabolic Screening:  Lipid profile:   Glucose screen:     Review of Systems  Gen: denies fever, chills, weight loss, fatigue  HEENT: denies sinus pressure, sinus congestion, runny nose, red eyes, itchy eyes, vision loss, ear pain, hearing loss, throat pain, trouble swallowing  Neck: denies neck pain, neck swelling or masses  Chest/breast: denies breast pain, breast lumps, nipple discharge  CV: denies chest pain, palpitations, fast heart rate, syncope  Resp: denies shortness of breath, cough, wheezing  GI: denies abdominal pain, nausea, diarrhea, constipation, hematochezia, melena  : denies dysuria, hematuria, vaginal discharge, frequency  Endo: denies polydipsia, polyuria, heat/cold intolerance, weight change, hair thinning  Heme: denies easy bruising, easy bleeding  Neuro: denies headache, numbness, tingling, memory loss, changes in vision  MSK:  denies joint pain, joint swelling, weakness  Psych: denies suicidal ideation, homicidal ideation, depression, anxiety, mood swings  Skin: denies rashes, abnormal lesions, itching, changes in moles     Previous History  Past Medical History:   Diagnosis Date    Abnormal weight loss 05/19/2020    Weight loss, unintentional    ADHD (attention deficit hyperactivity disorder) 2022    Allergic     Allergic rhinitis 08/17/2023    Allergic rhinitis, unspecified 08/13/2021    Allergic sinusitis    Anxiety     Atopic eczema 08/17/2023    Cellulitis of left lower limb 05/06/2015    Cellulitis of left thigh    Depression     Deviated nasal septum 04/21/2014    Nasal septal deviation    Gastritis, unspecified, with bleeding 04/21/2014    Gastritis, hemorrhagic    Irregular periods 03/23/2022    Jaw pain 04/21/2015    Pain in mandible    Night sweats 08/17/2023    Palpitation 08/17/2023    Personal history of other diseases of the respiratory system 12/19/2016    History of acute bronchitis    Personal history of other diseases of the respiratory system 03/12/2016    History of acute bacterial sinusitis    Personal history of other specified conditions 06/09/2014    History of lymphadenopathy    Personal history of peptic ulcer disease 04/21/2014    History of peptic ulcer    Personal history of traumatic brain injury 04/21/2014    History of concussion    Reaction to severe stress, unspecified 04/21/2014    Situational stress     Past Surgical History:   Procedure Laterality Date    RHINOPLASTY  04/21/2014    Rhinoplasty    SEPTOPLASTY  04/21/2014    Septoplasty    SINUS SURGERY  2009     Social History     Tobacco Use    Smoking status: Never     Passive exposure: Never    Smokeless tobacco: Never   Vaping Use    Vaping status: Never Used   Substance Use Topics    Alcohol use: Yes     Alcohol/week: 1.0 standard drink of alcohol     Types: 1 Standard drinks or equivalent per week    Drug use: Never     Family History   Problem  "Relation Name Age of Onset    Hyperlipidemia Mother Fabiana alcocer     Hypothyroidism Mother Fabiana alcocer     Hypertension Father Sam alcocer     Diabetes Father Sam alcocer     Stroke Father Sam alcocer     Depression Father Sam alcocer      Allergies   Allergen Reactions    Prochlorperazine Unknown     Muscle spasms     Current Outpatient Medications   Medication Instructions    buPROPion XL (WELLBUTRIN XL) 150 mg, oral, Daily before breakfast    cholecalciferol (Vitamin D-3) 50 mcg (2,000 unit) capsule 1 capsule, Daily    cyanocobalamin (VITAMIN B-12) 1,000 mcg, oral, Daily    fexofenadine (Allegra Allergy) 180 mg tablet Daily RT    ibuprofen 400 mg tablet Every 6 hours    omeprazole 20 mg tablet,disintegrat, delay rel Take by mouth.    sertraline (ZOLOFT) 100 mg, oral, Daily    triamcinolone (Kenalog) 0.1 % ointment 2 times daily     Immunization History   Administered Date(s) Administered    Flu vaccine (IIV4), preservative free *Check age/dose* 10/08/2014    Flu vaccine, trivalent, preservative free, age 6 months and greater (Fluarix/Fluzone/Flulaval) 12/01/2008, 09/30/2009, 10/07/2010    HPV, Quadrivalent 03/05/2010, 05/10/2010, 09/08/2010    Influenza, Unspecified 10/12/2023    Influenza, seasonal, injectable 11/09/2013    MMR vaccine, subcutaneous (MMR II) 07/18/2003    Moderna SARS-CoV-2 Vaccination 01/18/2021, 02/15/2021, 12/06/2021    PPD Test 09/16/2011, 05/07/2012    Tdap vaccine, age 7 year and older (BOOSTRIX, ADACEL) 06/25/2010, 01/23/2023       Visit Vitals  /73   Pulse 88   Temp 37.6 °C (99.6 °F) (Oral)   Ht 1.549 m (5' 1\")   Wt 72.5 kg (159 lb 12.8 oz)   SpO2 94%   BMI 30.19 kg/m²   Smoking Status Never   BSA 1.77 m²        Physical Exam  General: Alert and oriented, in no acute distress. Appears stated age, well-nourished, and well hydrated  HEENT:  - Head: Normocephalic and atraumatic   - Eyes: EOMI, PERRLA  - ENT: Hearing grossly intact. Mucus membranes pink and moist without lesions. " Tonsils present without swelling or exudates. Good dentition. TMs gray  Neck: Supple. No stiffness. No thyromegaly or thyroid nodules  Heart: RRR. No murmurs, clicks, or rubs  Lungs: Unlabored breathing. CTAB with no crackles, wheezes, or rhonchi  Abdomen: Normal BS in all 4 quadrants. Soft, non-tender, non-distended, with no masses  Extremities: Warm and well perfused. No edema. Normal peripheral pulses  Musculoskeletal: ROM intact. Strength 5/5 in BUE and BLE. No joint swelling. Normal gait and station  Neurological: Alert and oriented. No gross neurological deficits. Normal sensation. No weakness. DTRs +2/4   Psychological: Appropriate mood and affect  Skin: No rash, abnormal lesions, cyanosis, or erythema      Assessment and Plan     Mayelin was seen today for annual exam.  Diagnoses and all orders for this visit:  Encounter for annual general medical examination without abnormal findings in adult (Primary)  -     Hemoglobin A1C; Future  -     Hemoglobin A1C  Vitamin B 12 deficiency  Generalized anxiety disorder  Chronic major depressive disorder, recurrent episode (CMS-HCC)  Vitamin D deficiency  Flexural eczema         Assessment/Plan   Problem List Items Addressed This Visit             ICD-10-CM    Generalized anxiety disorder F41.1     Stable on current medications  Continue meds and exercise.            Chronic major depressive disorder, recurrent episode (CMS-HCC) F33.9     Stable on current medications  Continue meds and exercise.     Tips on getting through depression  DO:  Pace yourself.   Get involved in activities that make you feel good or feel like you've achieved something.   Avoid drugs and alcohol. Both make depression worse. Both can cause dangerous side effects with antidepressant medicines.   Exercise often to make yourself feel better. Physical activity seems to cause a chemical reaction in the body that can improve your mood. Exercising 4 to 6 times a week for at least 30 minutes each time  is a good goal. But even less activity can be helpful.   Eat balanced meals and healthful foods.   Get enough sleep.   Take your medicine and/or go to counseling as often as your doctor recommends. Your medicine won't work if you only take it once in a while.   Set small goals for yourself, because you may have less energy.   Encourage yourself.   Get as much information as you can about depression and how to treat it.   Call your doctor or the local suicide crisis center right away if you start thinking about suicide.  DON'T:  Don't isolate yourself. Stay in touch with your loved ones and friends, your Bahai advisor, and your family doctor.   Don't believe negative thoughts you may have, such as blaming yourself or expecting to fail. This thinking is part of depression. These thoughts will go away as your depression lifts.   Don't blame yourself for your depression. You didn't cause it.   Don't make major life decisions (for example, about separation or divorce). You may not be thinking clearly while you are depressed, so the decisions you make at this time may not be the best ones for you. If you must make a big decision, ask someone you trust to help you.   Don't expect to do everything you normally can. Set a realistic schedule.   Don't get discouraged. It will take time for your depression to lift fully. Be patient with yourself.   Don't give up.              Vitamin D deficiency E55.9    Flexural eczema (Chronic) L20.82     Stable on Triamcinolone         Cystic acne vulgaris L70.0    Relevant Medications    tretinoin (Retin-A) 0.025 % cream     Other Visit Diagnoses         Codes    Encounter for annual general medical examination without abnormal findings in adult    -  Primary Z00.00    Relevant Orders    Hemoglobin A1C    Vitamin B 12 deficiency     E53.8            Current Outpatient Medications   Medication Sig Dispense Refill    buPROPion XL (Wellbutrin XL) 150 mg 24 hr tablet Take 1 tablet (150 mg)  by mouth once daily in the morning. Take before meals. 90 tablet 0    cholecalciferol (Vitamin D-3) 50 mcg (2,000 unit) capsule Take 1 capsule (50 mcg) by mouth once daily.      fexofenadine (Allegra Allergy) 180 mg tablet Take by mouth once daily.      ibuprofen 400 mg tablet Take by mouth every 6 hours.      sertraline (Zoloft) 100 mg tablet Take 1 tablet (100 mg) by mouth once daily. 90 tablet 1    triamcinolone (Kenalog) 0.1 % ointment twice a day.      cyanocobalamin (Vitamin B-12) 1,000 mcg tablet Take 1 tablet (1,000 mcg) by mouth once daily.      omeprazole (PriLOSEC) 20 mg DR capsule Take 1 capsule (20 mg) by mouth 2 times a day as needed. Do not crush or chew.      tretinoin (Retin-A) 0.025 % cream Apply topically once daily at bedtime. Apply to face 45 g 0     No current facility-administered medications for this visit.       Specialty Consultation notes reviewed  Labs to be done  Refills sent in  F/U in 6 months for depression and acne  Continue current medications  Call if any new concerns

## 2025-04-07 NOTE — ASSESSMENT & PLAN NOTE
Stable on current medications  Continue meds and exercise.     Tips on getting through depression  DO:  Pace yourself.   Get involved in activities that make you feel good or feel like you've achieved something.   Avoid drugs and alcohol. Both make depression worse. Both can cause dangerous side effects with antidepressant medicines.   Exercise often to make yourself feel better. Physical activity seems to cause a chemical reaction in the body that can improve your mood. Exercising 4 to 6 times a week for at least 30 minutes each time is a good goal. But even less activity can be helpful.   Eat balanced meals and healthful foods.   Get enough sleep.   Take your medicine and/or go to counseling as often as your doctor recommends. Your medicine won't work if you only take it once in a while.   Set small goals for yourself, because you may have less energy.   Encourage yourself.   Get as much information as you can about depression and how to treat it.   Call your doctor or the local suicide crisis center right away if you start thinking about suicide.  DON'T:  Don't isolate yourself. Stay in touch with your loved ones and friends, your Buddhist advisor, and your family doctor.   Don't believe negative thoughts you may have, such as blaming yourself or expecting to fail. This thinking is part of depression. These thoughts will go away as your depression lifts.   Don't blame yourself for your depression. You didn't cause it.   Don't make major life decisions (for example, about separation or divorce). You may not be thinking clearly while you are depressed, so the decisions you make at this time may not be the best ones for you. If you must make a big decision, ask someone you trust to help you.   Don't expect to do everything you normally can. Set a realistic schedule.   Don't get discouraged. It will take time for your depression to lift fully. Be patient with yourself.   Don't give up.

## 2025-05-31 LAB
25(OH)D3+25(OH)D2 SERPL-MCNC: 46 NG/ML (ref 30–100)
ALBUMIN SERPL-MCNC: 4.4 G/DL (ref 3.6–5.1)
ALP SERPL-CCNC: 57 U/L (ref 31–125)
ALT SERPL-CCNC: 12 U/L (ref 6–29)
ANION GAP SERPL CALCULATED.4IONS-SCNC: 11 MMOL/L (CALC) (ref 7–17)
AST SERPL-CCNC: 14 U/L (ref 10–30)
BASOPHILS # BLD AUTO: 47 CELLS/UL (ref 0–200)
BASOPHILS NFR BLD AUTO: 0.6 %
BILIRUB SERPL-MCNC: 0.4 MG/DL (ref 0.2–1.2)
BUN SERPL-MCNC: 12 MG/DL (ref 7–25)
CALCIUM SERPL-MCNC: 9.5 MG/DL (ref 8.6–10.2)
CHLORIDE SERPL-SCNC: 104 MMOL/L (ref 98–110)
CHOLEST SERPL-MCNC: 264 MG/DL
CHOLEST/HDLC SERPL: 5.3 (CALC)
CO2 SERPL-SCNC: 22 MMOL/L (ref 20–32)
CREAT SERPL-MCNC: 0.78 MG/DL (ref 0.5–0.97)
EGFRCR SERPLBLD CKD-EPI 2021: 103 ML/MIN/1.73M2
EOSINOPHIL # BLD AUTO: 218 CELLS/UL (ref 15–500)
EOSINOPHIL NFR BLD AUTO: 2.8 %
ERYTHROCYTE [DISTWIDTH] IN BLOOD BY AUTOMATED COUNT: 13.9 % (ref 11–15)
EST. AVERAGE GLUCOSE BLD GHB EST-MCNC: 111 MG/DL
EST. AVERAGE GLUCOSE BLD GHB EST-SCNC: 6.2 MMOL/L
GLUCOSE SERPL-MCNC: 89 MG/DL (ref 65–99)
HBA1C MFR BLD: 5.5 %
HCT VFR BLD AUTO: 44 % (ref 35–45)
HDLC SERPL-MCNC: 50 MG/DL
HGB BLD-MCNC: 13.9 G/DL (ref 11.7–15.5)
LDLC SERPL CALC-MCNC: 183 MG/DL (CALC)
LYMPHOCYTES # BLD AUTO: 2215 CELLS/UL (ref 850–3900)
LYMPHOCYTES NFR BLD AUTO: 28.4 %
MCH RBC QN AUTO: 28 PG (ref 27–33)
MCHC RBC AUTO-ENTMCNC: 31.6 G/DL (ref 32–36)
MCV RBC AUTO: 88.7 FL (ref 80–100)
MONOCYTES # BLD AUTO: 546 CELLS/UL (ref 200–950)
MONOCYTES NFR BLD AUTO: 7 %
NEUTROPHILS # BLD AUTO: 4774 CELLS/UL (ref 1500–7800)
NEUTROPHILS NFR BLD AUTO: 61.2 %
NONHDLC SERPL-MCNC: 214 MG/DL (CALC)
PLATELET # BLD AUTO: 339 THOUSAND/UL (ref 140–400)
PMV BLD REES-ECKER: 10.6 FL (ref 7.5–12.5)
POTASSIUM SERPL-SCNC: 4.1 MMOL/L (ref 3.5–5.3)
PROT SERPL-MCNC: 6.8 G/DL (ref 6.1–8.1)
RBC # BLD AUTO: 4.96 MILLION/UL (ref 3.8–5.1)
SODIUM SERPL-SCNC: 137 MMOL/L (ref 135–146)
TRIGL SERPL-MCNC: 160 MG/DL
TSH SERPL-ACNC: 2.59 MIU/L
VIT B12 SERPL-MCNC: 715 PG/ML (ref 200–1100)
WBC # BLD AUTO: 7.8 THOUSAND/UL (ref 3.8–10.8)

## 2025-06-10 DIAGNOSIS — F33.9 RECURRENT MAJOR DEPRESSIVE DISORDER, REMISSION STATUS UNSPECIFIED: ICD-10-CM

## 2025-06-10 RX ORDER — BUPROPION HYDROCHLORIDE 150 MG/1
150 TABLET ORAL
Qty: 90 TABLET | Refills: 0 | Status: SHIPPED | OUTPATIENT
Start: 2025-06-10

## 2025-10-09 ENCOUNTER — APPOINTMENT (OUTPATIENT)
Dept: PRIMARY CARE | Facility: CLINIC | Age: 35
End: 2025-10-09
Payer: COMMERCIAL